# Patient Record
Sex: MALE | Race: WHITE | NOT HISPANIC OR LATINO | Employment: FULL TIME | URBAN - METROPOLITAN AREA
[De-identification: names, ages, dates, MRNs, and addresses within clinical notes are randomized per-mention and may not be internally consistent; named-entity substitution may affect disease eponyms.]

---

## 2017-05-17 ENCOUNTER — HOSPITAL ENCOUNTER (OUTPATIENT)
Dept: GENERAL RADIOLOGY | Facility: CLINIC | Age: 52
Discharge: HOME | End: 2017-05-17
Attending: DERMATOLOGY | Admitting: DERMATOLOGY
Payer: COMMERCIAL

## 2017-05-17 DIAGNOSIS — L40.0 PSORIASIS VULGARIS: ICD-10-CM

## 2017-05-17 PROCEDURE — 71020 XR CHEST 2 VW: CPT

## 2021-12-06 ASSESSMENT — ANXIETY QUESTIONNAIRES
1. FEELING NERVOUS, ANXIOUS, OR ON EDGE: NOT AT ALL
7. FEELING AFRAID AS IF SOMETHING AWFUL MIGHT HAPPEN: NOT AT ALL
GAD7 TOTAL SCORE: 0
5. BEING SO RESTLESS THAT IT IS HARD TO SIT STILL: NOT AT ALL
4. TROUBLE RELAXING: NOT AT ALL
5. BEING SO RESTLESS THAT IT IS HARD TO SIT STILL: NOT AT ALL
2. NOT BEING ABLE TO STOP OR CONTROL WORRYING: NOT AT ALL
8. IF YOU CHECKED OFF ANY PROBLEMS, HOW DIFFICULT HAVE THESE MADE IT FOR YOU TO DO YOUR WORK, TAKE CARE OF THINGS AT HOME, OR GET ALONG WITH OTHER PEOPLE?: NOT DIFFICULT AT ALL
4. TROUBLE RELAXING: NOT AT ALL
7. FEELING AFRAID AS IF SOMETHING AWFUL MIGHT HAPPEN: NOT AT ALL
GAD7 TOTAL SCORE: 0
3. WORRYING TOO MUCH ABOUT DIFFERENT THINGS: NOT AT ALL
6. BECOMING EASILY ANNOYED OR IRRITABLE: NOT AT ALL
6. BECOMING EASILY ANNOYED OR IRRITABLE: NOT AT ALL
1. FEELING NERVOUS, ANXIOUS, OR ON EDGE: NOT AT ALL
3. WORRYING TOO MUCH ABOUT DIFFERENT THINGS: NOT AT ALL
2. NOT BEING ABLE TO STOP OR CONTROL WORRYING: NOT AT ALL
8. IF YOU CHECKED OFF ANY PROBLEMS, HOW DIFFICULT HAVE THESE MADE IT FOR YOU TO DO YOUR WORK, TAKE CARE OF THINGS AT HOME, OR GET ALONG WITH OTHER PEOPLE?: NOT DIFFICULT AT ALL
7. FEELING AFRAID AS IF SOMETHING AWFUL MIGHT HAPPEN: NOT AT ALL
GAD7 TOTAL SCORE: 0
7. FEELING AFRAID AS IF SOMETHING AWFUL MIGHT HAPPEN: NOT AT ALL

## 2021-12-07 ENCOUNTER — VIRTUAL VISIT (OUTPATIENT)
Dept: INTERNAL MEDICINE | Facility: CLINIC | Age: 56
End: 2021-12-07
Payer: COMMERCIAL

## 2021-12-07 DIAGNOSIS — Z22.7 LTBI (LATENT TUBERCULOSIS INFECTION): Primary | ICD-10-CM

## 2021-12-07 DIAGNOSIS — Z00.00 ENCOUNTER FOR PREVENTATIVE ADULT HEALTH CARE EXAMINATION: Primary | ICD-10-CM

## 2021-12-07 PROCEDURE — 99207 PR NO CHARGE LOS: CPT

## 2021-12-07 RX ORDER — USTEKINUMAB 90 MG/ML
90 INJECTION, SOLUTION SUBCUTANEOUS
COMMUNITY
Start: 2020-03-09

## 2021-12-07 RX ORDER — ATORVASTATIN CALCIUM 20 MG/1
20 TABLET, FILM COATED ORAL
COMMUNITY
Start: 2020-10-28 | End: 2022-06-21

## 2021-12-07 ASSESSMENT — ANXIETY QUESTIONNAIRES
GAD7 TOTAL SCORE: 0
GAD7 TOTAL SCORE: 0

## 2021-12-07 NOTE — PROGRESS NOTES
Health Maintenance:  Do you have a PCP? No  When was your last visit with your PCP?   When was your last eye exam? 1 year  Have you ever had a colonoscopy? Yes   If yes, when? 2020  Have you ever had any polyps removed? Yes     As part of your visit we will set up a DEXA scan which will measure your body composition. We have a few questions that need to be answered before we can schedule this scan:   What is your approximate weight? 210   Have you ever had a DEXA scan within the past 2 years? No   Will you have any other imaging studies with contrast (x-ray, CT scan) within 7  days of this appointment? No   Have you had any spine or hip surgery? No   Do you take any vitamins that contain calcium or antacids with calcium? No    If yes, stop taking 24 hours prior to visit.     Goals for the Visit:  1. Thorough Comprehensive Preventive Exam  2. Need chest xray  3.   Pertinent past Medical/Family and Social HX:   Pertinent sx that desire are addressed with this visit:     Answers for HPI/ROS submitted by the patient on 12/6/2021  NEENA 7 TOTAL SCORE: 0  General Symptoms: No  Skin Symptoms: No  HENT Symptoms: No  EYE SYMPTOMS: No  HEART SYMPTOMS: No  LUNG SYMPTOMS: No  INTESTINAL SYMPTOMS: No  URINARY SYMPTOMS: No  REPRODUCTIVE SYMPTOMS: No  SKELETAL SYMPTOMS: No  BLOOD SYMPTOMS: No  NERVOUS SYSTEM SYMPTOMS: No  MENTAL HEALTH SYMPTOMS: No        Instructions prior to appointment:   1. Fast beginning at 10 pm for lab appointment  2. If your preventive care assessment package includes a Fitness Assessment, please bring athletic shoes. Complementary [employer] fitness attire is provided and yours to keep.  3. If eye exam, eyes may be dilated, it will last 4-6 hours, may want to bring sunglasses.   4. May bring laptop or other work materials for use during downtime.   5. You will receive an email about 3 days prior to your visit with a final itinerary, menu selections for the complementary breakfast and lunch  and instructions for the visit.     Complimentary  Parking provided. Drop off car in front of [facility], take the patient elevators to the [facility]. When you enter in the lobby, identify yourself as an [facility] [atient and you will be escorted up to the clinic.   If questions arise prior to your appointment please contact the clinic at [phone].

## 2021-12-13 ENCOUNTER — APPOINTMENT (OUTPATIENT)
Dept: INTERNAL MEDICINE | Facility: CLINIC | Age: 56
End: 2021-12-13
Payer: COMMERCIAL

## 2021-12-13 ENCOUNTER — OFFICE VISIT (OUTPATIENT)
Dept: INTERNAL MEDICINE | Facility: CLINIC | Age: 56
End: 2021-12-13
Payer: COMMERCIAL

## 2021-12-13 ENCOUNTER — ANCILLARY PROCEDURE (OUTPATIENT)
Dept: GENERAL RADIOLOGY | Facility: CLINIC | Age: 56
End: 2021-12-13
Attending: INTERNAL MEDICINE
Payer: COMMERCIAL

## 2021-12-13 ENCOUNTER — OFFICE VISIT (OUTPATIENT)
Dept: AUDIOLOGY | Facility: CLINIC | Age: 56
End: 2021-12-13
Payer: COMMERCIAL

## 2021-12-13 ENCOUNTER — OFFICE VISIT (OUTPATIENT)
Dept: GASTROENTEROLOGY | Facility: CLINIC | Age: 56
End: 2021-12-13
Payer: COMMERCIAL

## 2021-12-13 ENCOUNTER — ANCILLARY PROCEDURE (OUTPATIENT)
Dept: BONE DENSITY | Facility: CLINIC | Age: 56
End: 2021-12-13
Payer: COMMERCIAL

## 2021-12-13 ENCOUNTER — OFFICE VISIT (OUTPATIENT)
Dept: DERMATOLOGY | Facility: CLINIC | Age: 56
End: 2021-12-13
Payer: COMMERCIAL

## 2021-12-13 VITALS
HEART RATE: 56 BPM | HEIGHT: 74 IN | RESPIRATION RATE: 16 BRPM | OXYGEN SATURATION: 99 % | BODY MASS INDEX: 28.11 KG/M2 | TEMPERATURE: 98.3 F | WEIGHT: 219 LBS

## 2021-12-13 DIAGNOSIS — R03.0 ELEVATED BLOOD PRESSURE READING WITHOUT DIAGNOSIS OF HYPERTENSION: ICD-10-CM

## 2021-12-13 DIAGNOSIS — D18.01 CHERRY ANGIOMA: ICD-10-CM

## 2021-12-13 DIAGNOSIS — G47.30 SLEEP APNEA, UNSPECIFIED TYPE: ICD-10-CM

## 2021-12-13 DIAGNOSIS — Z00.00 ENCOUNTER FOR PREVENTATIVE ADULT HEALTH CARE EXAMINATION: ICD-10-CM

## 2021-12-13 DIAGNOSIS — L82.1 SEBORRHEIC KERATOSIS: Primary | ICD-10-CM

## 2021-12-13 DIAGNOSIS — I77.810 ASCENDING AORTA DILATATION (H): ICD-10-CM

## 2021-12-13 DIAGNOSIS — Z12.83 SKIN CANCER SCREENING: ICD-10-CM

## 2021-12-13 DIAGNOSIS — H90.3 SENSORINEURAL HEARING LOSS, BILATERAL: Primary | ICD-10-CM

## 2021-12-13 DIAGNOSIS — E78.5 DYSLIPIDEMIA: ICD-10-CM

## 2021-12-13 DIAGNOSIS — Z00.00 ENCOUNTER FOR PREVENTATIVE ADULT HEALTH CARE EXAMINATION: Primary | ICD-10-CM

## 2021-12-13 DIAGNOSIS — Z22.7 LTBI (LATENT TUBERCULOSIS INFECTION): ICD-10-CM

## 2021-12-13 DIAGNOSIS — E66.3 OVERWEIGHT: ICD-10-CM

## 2021-12-13 DIAGNOSIS — Z29.9 PREVENTIVE MEASURE: ICD-10-CM

## 2021-12-13 DIAGNOSIS — L81.4 LENTIGINES: ICD-10-CM

## 2021-12-13 DIAGNOSIS — D22.9 MULTIPLE BENIGN NEVI: ICD-10-CM

## 2021-12-13 DIAGNOSIS — L40.9 PSORIASIS: ICD-10-CM

## 2021-12-13 LAB
ALBUMIN UR-MCNC: NEGATIVE MG/DL
ALP SERPL-CCNC: 53 U/L (ref 40–150)
ALT SERPL W P-5'-P-CCNC: 44 U/L (ref 0–70)
APPEARANCE UR: CLEAR
BASOPHILS # BLD AUTO: 0 10E3/UL (ref 0–0.2)
BASOPHILS NFR BLD AUTO: 1 %
BILIRUB UR QL STRIP: NEGATIVE
CHOLEST SERPL-MCNC: 174 MG/DL
COLOR UR AUTO: YELLOW
CREAT SERPL-MCNC: 0.97 MG/DL (ref 0.66–1.25)
DEPRECATED CALCIDIOL+CALCIFEROL SERPL-MC: 24 UG/L (ref 20–75)
EOSINOPHIL # BLD AUTO: 0.2 10E3/UL (ref 0–0.7)
EOSINOPHIL NFR BLD AUTO: 3 %
ERYTHROCYTE [DISTWIDTH] IN BLOOD BY AUTOMATED COUNT: 12.5 % (ref 10–15)
FASTING STATUS PATIENT QL REPORTED: ABNORMAL
FASTING STATUS PATIENT QL REPORTED: NORMAL
GFR SERPL CREATININE-BSD FRML MDRD: 87 ML/MIN/1.73M2
GLUCOSE BLD-MCNC: 99 MG/DL (ref 70–99)
GLUCOSE UR STRIP-MCNC: NEGATIVE MG/DL
HCT VFR BLD AUTO: 47.4 % (ref 40–53)
HDLC SERPL-MCNC: 47 MG/DL
HGB BLD-MCNC: 16.8 G/DL (ref 13.3–17.7)
HGB UR QL STRIP: NEGATIVE
HIV 1+2 AB+HIV1 P24 AG SERPL QL IA: NONREACTIVE
HOLD SPECIMEN: NORMAL
HOLD SPECIMEN: NORMAL
IMM GRANULOCYTES # BLD: 0 10E3/UL
IMM GRANULOCYTES NFR BLD: 0 %
KETONES UR STRIP-MCNC: NEGATIVE MG/DL
LDLC SERPL CALC-MCNC: 107 MG/DL
LEUKOCYTE ESTERASE UR QL STRIP: NEGATIVE
LYMPHOCYTES # BLD AUTO: 1.8 10E3/UL (ref 0.8–5.3)
LYMPHOCYTES NFR BLD AUTO: 35 %
MCH RBC QN AUTO: 33 PG (ref 26.5–33)
MCHC RBC AUTO-ENTMCNC: 35.4 G/DL (ref 31.5–36.5)
MCV RBC AUTO: 93 FL (ref 78–100)
MONOCYTES # BLD AUTO: 0.5 10E3/UL (ref 0–1.3)
MONOCYTES NFR BLD AUTO: 10 %
MUCOUS THREADS #/AREA URNS LPF: PRESENT /LPF
NEUTROPHILS # BLD AUTO: 2.7 10E3/UL (ref 1.6–8.3)
NEUTROPHILS NFR BLD AUTO: 51 %
NITRATE UR QL: NEGATIVE
NONHDLC SERPL-MCNC: 127 MG/DL
NRBC # BLD AUTO: 0 10E3/UL
NRBC BLD AUTO-RTO: 0 /100
PH UR STRIP: 5 [PH] (ref 5–7)
PLATELET # BLD AUTO: 166 10E3/UL (ref 150–450)
PSA SERPL-MCNC: 0.43 UG/L (ref 0–4)
RADIOLOGIST FLAGS: NORMAL
RBC # BLD AUTO: 5.09 10E6/UL (ref 4.4–5.9)
RBC URINE: 1 /HPF
SP GR UR STRIP: 1.02 (ref 1–1.03)
TRIGL SERPL-MCNC: 100 MG/DL
TSH SERPL DL<=0.005 MIU/L-ACNC: 1.26 MU/L (ref 0.4–4)
UROBILINOGEN UR STRIP-MCNC: NORMAL MG/DL
WBC # BLD AUTO: 5.3 10E3/UL (ref 4–11)
WBC URINE: <1 /HPF

## 2021-12-13 PROCEDURE — 92557 COMPREHENSIVE HEARING TEST: CPT | Performed by: AUDIOLOGIST

## 2021-12-13 PROCEDURE — 99207 PR NO CHARGE LOS: CPT

## 2021-12-13 PROCEDURE — 77080 DXA BONE DENSITY AXIAL: CPT | Performed by: INTERNAL MEDICINE

## 2021-12-13 PROCEDURE — 80061 LIPID PANEL: CPT | Performed by: PATHOLOGY

## 2021-12-13 PROCEDURE — 99386 PREV VISIT NEW AGE 40-64: CPT | Mod: 25 | Performed by: INTERNAL MEDICINE

## 2021-12-13 PROCEDURE — 84460 ALANINE AMINO (ALT) (SGPT): CPT | Performed by: PATHOLOGY

## 2021-12-13 PROCEDURE — 99000 SPECIMEN HANDLING OFFICE-LAB: CPT | Performed by: PATHOLOGY

## 2021-12-13 PROCEDURE — 82947 ASSAY GLUCOSE BLOOD QUANT: CPT | Performed by: PATHOLOGY

## 2021-12-13 PROCEDURE — 94375 RESPIRATORY FLOW VOLUME LOOP: CPT | Performed by: INTERNAL MEDICINE

## 2021-12-13 PROCEDURE — 90715 TDAP VACCINE 7 YRS/> IM: CPT | Performed by: INTERNAL MEDICINE

## 2021-12-13 PROCEDURE — 87389 HIV-1 AG W/HIV-1&-2 AB AG IA: CPT | Mod: 90 | Performed by: PATHOLOGY

## 2021-12-13 PROCEDURE — 84443 ASSAY THYROID STIM HORMONE: CPT | Performed by: PATHOLOGY

## 2021-12-13 PROCEDURE — 92567 TYMPANOMETRY: CPT | Performed by: AUDIOLOGIST

## 2021-12-13 PROCEDURE — 85025 COMPLETE CBC W/AUTO DIFF WBC: CPT | Performed by: PATHOLOGY

## 2021-12-13 PROCEDURE — 93000 ELECTROCARDIOGRAM COMPLETE: CPT | Performed by: INTERNAL MEDICINE

## 2021-12-13 PROCEDURE — 99203 OFFICE O/P NEW LOW 30 MIN: CPT | Performed by: PHYSICIAN ASSISTANT

## 2021-12-13 PROCEDURE — 82306 VITAMIN D 25 HYDROXY: CPT | Mod: 90 | Performed by: PATHOLOGY

## 2021-12-13 PROCEDURE — 96999 UNLISTED SPEC DERM SVC/PX: CPT | Performed by: INTERNAL MEDICINE

## 2021-12-13 PROCEDURE — 81001 URINALYSIS AUTO W/SCOPE: CPT | Performed by: PATHOLOGY

## 2021-12-13 PROCEDURE — 82565 ASSAY OF CREATININE: CPT | Performed by: PATHOLOGY

## 2021-12-13 PROCEDURE — 84075 ASSAY ALKALINE PHOSPHATASE: CPT | Performed by: PATHOLOGY

## 2021-12-13 PROCEDURE — 71046 X-RAY EXAM CHEST 2 VIEWS: CPT | Mod: GC | Performed by: RADIOLOGY

## 2021-12-13 PROCEDURE — G0103 PSA SCREENING: HCPCS | Performed by: PATHOLOGY

## 2021-12-13 PROCEDURE — 36415 COLL VENOUS BLD VENIPUNCTURE: CPT | Performed by: PATHOLOGY

## 2021-12-13 PROCEDURE — 99207 PR NO BILLABLE SERVICE THIS VISIT: CPT | Performed by: DIETITIAN, REGISTERED

## 2021-12-13 RX ORDER — PREDNISONE 20 MG/1
20 TABLET ORAL 2 TIMES DAILY
Qty: 3 TABLET | Refills: 0 | Status: SHIPPED | OUTPATIENT
Start: 2021-12-13 | End: 2021-12-13

## 2021-12-13 RX ORDER — DIPHENHYDRAMINE HCL 25 MG
25 TABLET ORAL EVERY 8 HOURS PRN
Qty: 1 TABLET | Refills: 0 | COMMUNITY
Start: 2021-12-13

## 2021-12-13 ASSESSMENT — PAIN SCALES - GENERAL
PAINLEVEL: NO PAIN (0)
PAINLEVEL: NO PAIN (0)

## 2021-12-13 ASSESSMENT — MIFFLIN-ST. JEOR: SCORE: 1893.13

## 2021-12-13 NOTE — NURSING NOTE
Chief Complaint   Patient presents with     Physical     Patient is here for annual physical     Uyen Snowden CMA 7:53 AM on 12/13/2021.

## 2021-12-13 NOTE — LETTER
"December 27, 2021      Neel NAM Julio  [address] [address]              Dear Landen,     It was a pleasure to have met you at your recent visit to the [facility].  I am writing to report the results of your tests, and to review several recommendations.     A complete blood count was normal, including a white blood cell count of 5300, a hemoglobin level of 16.8, and a platelet count of 166,000.  A creatinine level, which measures kidney function, was normal at 0.97 milligrams/deciliter.  Alkaline phosphatase and alanine aminotransferase (ALT), enzymes that measure liver activity, were normal at 53 and 44 units/liter, respectively.     Your total cholesterol level was 174, with a triglyceride level of 100, an HDL or \"good\" cholesterol level of 47, an LDL or \"bad\" cholesterol level of 107, and a cholesterol/HDL ratio of 3.7.  As we discussed at the time of your appointment, guidelines from the American Heart Association and American College of Cardiology estimate your 10-year risk of a vascular event at 5.9% (optimal for your age/gender cohort is 3.9%).  I encourage you to continue a regimen of regular exercise, while maintaining ideal weight and following a modified diet.  Specifically, I encourage you to limit calorie intake, especially sugars, starches, saturated and \"trans-\" fats, while including monounsaturated and omega-3 fats in your diet.  Sources of monounsaturated fat include olive oil, almonds, and avocados; sources of omega-3 fat include oily fish, such as salmon, sardines, light tuna, and trout; other sources include walnuts and flaxseed oil.  You were advised to discuss with your cardiologist whether to consider daily aspirin use, based on your history of a positive coronary calcium scan in the setting of your recent CT suggesting dilation of the sinuses of Valsalva (see below).    A PSA level, which screens for prostate problems, was acceptable " "at 0.43.  A thyroid stimulating hormone (TSH) level, which measures thyroid function, was normal at 1.26.  Your glucose level was normal at 99.  An HIV screen was nonreactive, or negative/normal.  Analysis of your urine was negative, or normal.     An EKG was unremarkable.  A spirometry test, which measures lung function, showed an FEV1 of 4.12, with an FVC of 5.52; these readings were 97% and 100% of predicted values, respectively.     Dual energy x-ray absorptiometry (DEXA) showed normal bone density, with most negative and valid T-score of -0.4 at the level of the left femoral neck.      Body composition analysis showed 24.6% fat (53rd percentile).  Your body mass index was 28.1; generally, readings of 20-25 are considered normal.  In addition to regular exercise and reduced calorie intake, strategies to facilitate weight loss include copious water intake; use of small plates; healthful snacking, such as small quantities of almonds or walnuts consumed mid-morning and mid-afternoon; avoidance of sugars and starches, which can trigger increased insulin release and stimulate appetite; slow, \"mindful\" eating, with pleasing food presentation and focus on non-caloric aspects of the dining experience, including social interactions and appreciation of the aromas, appearance, and flavors of the meal.  Preliminary evidence suggests possible weight and glucose control benefits from intermittent fasting, such as limiting food consumption to an 8-hour window each day, while fasting for the remaining 16 hours of each day.  Because this may lead to loss of muscle mass, it is important to maintain adequate protein intake and engage in strength training should you consider this approach.    At your request, a chest x-ray was arranged.  Your results showed no acute cardiopulmonary processes to suggest mycobacterial disease, such as TB.  A question was raised regarding the ascending aorta, for which chest CT angiogram was " "recommended for further evaluation.    A CT angiogram of the chest with contrast showed no thoracic aortic aneurysm or dissection, with mild splenomegaly and possible mild dilation of the sinuses of Valsalva.  You were advised to arrange hematology consultation to discuss the finding of splenomegaly.  Consultation with a cardiologist was recommended to discuss any need for follow-up of the 41-mm diameter sinuses of Valsalva and to review the risk versus benefit of aspirin treatment in the setting of an elevated 10-year risk of vascular disease (see above) and previously identified coronary artery calcification.    At a previous sleep clinic evaluation, you were advised to arrange a follow-up appointment to monitor your response to use of an oral appliance.  For this reason and because untreated sleep apnea can contribute to elevated blood pressure, I recommend that you schedule sleep clinic consultation at your convenience.    At the time of your appointment, your blood pressure was elevated at 133/85 on average of 3 automated readings.  In addition to arranging sleep clinic follow-up, you were advised to reduce weight, continue a regimen of regular aerobic (\"cardio\") exercise, and avoid substances that can raise blood pressure, including ibuprofen, naproxen, pseudoephedrine, and alcohol.  Please measure and record your home blood pressure readings and discuss your findings with your usual physician if your average blood pressure exceeds 129/84.    With regard to preventive care, I recommend daily use of a vitamin D3 supplement, 100 mcg daily for 2 months, then 50 mcg daily.  You were advised to maintain daily calcium intake of 1200 mg, preferably from dietary sources.  We reviewed the debate regarding the benefit of multivitamin supplements and the importance of selecting a product that does not contain iron should you choose to proceed.  A tetanus (Tdap) vaccination was provided at the time of your appointment, " bringing your immunization status up-to-date.  We agreed that you would schedule your second recombinant zoster (shingles) vaccination through an outside clinic.  You were advised to follow the previously recommended timetable for follow-up colonoscopy.    Bill, I enjoyed visiting with you at your recent appointment and learning about your path to [facility] and the talents of your daughters. Thank you for choosing the [facility] and [facility] for your medical needs.  Best wishes for a productive and healthy year.  Please do not hesitate to contact me with any questions regarding these results or recommendations.           Sincerely,           Neel Sawant M.D.  [facility]   Division of General Internal Medicine  Department of Medicine

## 2021-12-13 NOTE — PROGRESS NOTES
"[facility] Outpatient Medical Nutrition Therapy      Time Spent:  35 minutes  Session Type:  Initial   Referral Source: [employer] Package/Dr. Neel Sawant  Reason for RD Visit:   Nutritional counseling     Nutrition Assessment:  Patient is here for initial annual visit with Registered Dietitian (LORI).  Patient is a 56 y.o. male with history that includes obstructive sleep apnea. He stated that overall he feels he needs a healthy diet.  He tries to eat a good amounts of vegetables along with lean proteins.  He tends to skip breakfast due to not hungry in the morning and tends to like hot breakfast items more than cold so due to not being hungry does not typically eat it unless traveling.  He tends to eat a lunch and dinner meal and sometimes has either pretzels or chips as a snack in the afternoon, estimated a few times per week.  He drinks 3 to 4 cups of black coffee and at least 2-3 of his water bottles per day plus additional water with exercise.  He has 1 alcoholic beverage a night on week nights and on weekends may have 2-3 drinks on those nights each.  He exercises most days of the week and additionally takes daily dog walks for 1-1/2 to 3 miles with his wife.  Overall he is interested in general nutrition information and does not currently have any specific questions or concerns nutrition wise.      There is no problem list on file for this patient.      Estimated body mass index is 28.12 kg/m  as calculated from the following:    Height as of an earlier encounter on 12/13/21: 1.88 m (6' 2\").    Weight as of an earlier encounter on 12/13/21: 99.3 kg (219 lb).    Diet Recall:  (Some usual meals/snacks and beverages))  Meal Food    Breakfast  skips   Lunch  salad with meat or fish or soup with shrimp or scallops or meat with vegetables   Dinner  chicken or salmon with vegetables occasionally some rice or occasional Posta dish with sausage and tomato sauce   Snacks  a few times a week in the " afternoon has pretzels or potato chips    Beverages  at least 32 to 48 ounce water plus and 3 to 4 cups of black coffee   Alcohol Intake  1 drink each night on week nights and 2-3 drinks on weekend nights.  Typically beer or bourbon or wine        Labs: Reviewed  Pertinent Medications/vitamin and mineral supplements:     Current Outpatient Medications   Medication     atorvastatin (LIPITOR) 20 MG tablet     ustekinumab (STELARA) 90 MG/ML     No current facility-administered medications for this visit.       Food Allergies: No known food allergies    Physical Activity: Most days does rowing machine or [employer] bike running or boxing for at least 30 minutes plus takes 1-1/2 to 3 mile dog walks every day    Nutrition Diagnosis:    Food and nutrition related knowledge deficit related to interest in general healthful diet as evidenced by pt report.    Nutrition Prescription: Recommended general healthful diet     Nutrition Intervention:    Nutrition Education/Counseling:  -Provided general healthful diet nutrition education with tips and suggestions.   -Reviewed the Plate method meal plan with food groups and some example foods in groups.   -Discussed general sodium recommendations of getting less than 2300 mg/day. Discussed some low sodium diet tips.   -Reviewed the difference between unsaturated and saturated fats with recommendation to aim for choosing unsaturated fat over saturated fats and discussed examples of both.  -Encouraged patient to eat adequate fruit and vegetable servings per day (at least 5 servings but explained recommendation to aim for 9-11 servings per day).   -Discussed adequate hydration/recommendations and encouraged pt to drink at least 48 oz-64 oz (6-8 cups) per day. Told pt okay add lemon/lime to water or can flavor with cucumber slice or fresh herbs/fruit for example to naturally flavor water.     Answered patient's questions. Patient verbalized understanding of education provided. Provided pt  with RD contact information and list of goals below.    Educational Materials Provided:   Health Daily Nutrition Plate method handout    Goals:    1. Continue eating balanced meals with several servings of vegetables and fruit at each.    --Can use/continue to use the Plate Method meal plan for general guidance on getting balanced meals, such as making 1/2 your plate vegetables and fruit, 1/4 of your plate lean protein sources and the other 1/4 of your plate whole grain starches/starchy vegetables. Additionally include at least 1-2 servings of unsaturated fats at meals (olive oil, avocado, nuts, seeds, nut butters as some examples).    2. Increase water/consistently drink at least 48 oz-64 oz (6-8 cups) of water per day.    Nutrition Monitoring and Evaluation: Will monitor adherence to nutrition recommendations at future RD visits.     Further Medical Nutrition Therapy:  Annual visits  Patient was encouraged to call/contact RD with any further questions.    Mila Blanco, MS, RD, LD

## 2021-12-13 NOTE — LETTER
"    12/13/2021         RE: Neel Kim  [address] [address]        Dear Colleague,    Thank you for referring your patient, Neel Kim, to the [facility] [place]. Please see a copy of my visit note below.    [facility] Outpatient Medical Nutrition Therapy      Time Spent:  35 minutes  Session Type:  Initial   Referral Source: [employer] Package/Dr. Neel Sawant  Reason for RD Visit:   Nutritional counseling     Nutrition Assessment:  Patient is here for initial annual visit with Registered Dietitian (LORI).  Patient is a 56 y.o. male with history that includes obstructive sleep apnea. He stated that overall he feels he needs a healthy diet.  He tries to eat a good amounts of vegetables along with lean proteins.  He tends to skip breakfast due to not hungry in the morning and tends to like hot breakfast items more than cold so due to not being hungry does not typically eat it unless traveling.  He tends to eat a lunch and dinner meal and sometimes has either pretzels or chips as a snack in the afternoon, estimated a few times per week.  He drinks 3 to 4 cups of black coffee and at least 2-3 of his water bottles per day plus additional water with exercise.  He has 1 alcoholic beverage a night on week nights and on weekends may have 2-3 drinks on those nights each.  He exercises most days of the week and additionally takes daily dog walks for 1-1/2 to 3 miles with his wife.  Overall he is interested in general nutrition information and does not currently have any specific questions or concerns nutrition wise.      There is no problem list on file for this patient.      Estimated body mass index is 28.12 kg/m  as calculated from the following:    Height as of an earlier encounter on 12/13/21: 1.88 m (6' 2\").    Weight as of an earlier encounter on 12/13/21: 99.3 kg (219 lb).    Diet Recall:  (Some usual meals/snacks and " beverages))  Meal Food    Breakfast  skips   Lunch  salad with meat or fish or soup with shrimp or scallops or meat with vegetables   Dinner  chicken or salmon with vegetables occasionally some rice or occasional Posta dish with sausage and tomato sauce   Snacks  a few times a week in the afternoon has pretzels or potato chips    Beverages  at least 32 to 48 ounce water plus and 3 to 4 cups of black coffee   Alcohol Intake  1 drink each night on week nights and 2-3 drinks on weekend nights.  Typically beer or bourbon or wine        Labs: Reviewed  Pertinent Medications/vitamin and mineral supplements:     Current Outpatient Medications   Medication     atorvastatin (LIPITOR) 20 MG tablet     ustekinumab (STELARA) 90 MG/ML     No current facility-administered medications for this visit.       Food Allergies: No known food allergies    Physical Activity: Most days does rowing machine or [employer] bike running or boxing for at least 30 minutes plus takes 1-1/2 to 3 mile dog walks every day    Nutrition Diagnosis:    Food and nutrition related knowledge deficit related to interest in general healthful diet as evidenced by pt report.    Nutrition Prescription: Recommended general healthful diet     Nutrition Intervention:    Nutrition Education/Counseling:  -Provided general healthful diet nutrition education with tips and suggestions.   -Reviewed the Plate method meal plan with food groups and some example foods in groups.   -Discussed general sodium recommendations of getting less than 2300 mg/day. Discussed some low sodium diet tips.   -Reviewed the difference between unsaturated and saturated fats with recommendation to aim for choosing unsaturated fat over saturated fats and discussed examples of both.  -Encouraged patient to eat adequate fruit and vegetable servings per day (at least 5 servings but explained recommendation to aim for 9-11 servings per day).   -Discussed adequate hydration/recommendations and  encouraged pt to drink at least 48 oz-64 oz (6-8 cups) per day. Told pt okay add lemon/lime to water or can flavor with cucumber slice or fresh herbs/fruit for example to naturally flavor water.     Answered patient's questions. Patient verbalized understanding of education provided. Provided pt with RD contact information and list of goals below.    Educational Materials Provided:   Health Daily Nutrition Plate method handout    Goals:    1. Continue eating balanced meals with several servings of vegetables and fruit at each.    --Can use/continue to use the Plate Method meal plan for general guidance on getting balanced meals, such as making 1/2 your plate vegetables and fruit, 1/4 of your plate lean protein sources and the other 1/4 of your plate whole grain starches/starchy vegetables. Additionally include at least 1-2 servings of unsaturated fats at meals (olive oil, avocado, nuts, seeds, nut butters as some examples).    2. Increase water/consistently drink at least 48 oz-64 oz (6-8 cups) of water per day.    Nutrition Monitoring and Evaluation: Will monitor adherence to nutrition recommendations at future RD visits.     Further Medical Nutrition Therapy:  Annual visits  Patient was encouraged to call/contact RD with any further questions.    Mila Blanco, MS, RD, LD

## 2021-12-13 NOTE — LETTER
12/13/2021      RE: Neel Kim  [address] [facility] [address]        History and Physical Examination     SUBJECTIVE: Chief concern: preventive health review.     Past Medical History:  1.  History of adenomatous polyp (1.2 cm sessile serrated adenoma, 2020); 3-year follow-up recommended at time of diagnosis.  2.  Obstructive sleep apnea with questionable central component, managed with oral appliance.  3.  Dyslipidemia.  4.  Coronary artery disease by coronary calcium CT, 10/2018 (76th percentile).  5.  Psoriasis.  6.  History of latent tuberculosis, status post treatment    Adverse Drug Reactions: None.     Current Medications:  Atorvastatin, 20 mg daily   Ustekinumab, 90 mg/millimeter, 90 mg subcutaneously every 12 weeks.  Tar shampoo, as needed every 2 weeks.  Calcipotriene 0.005% ointment b.i.d. as needed for breakthrough rash.      Habits:  Tobacco: None since 2001; history of 24 pack-years of previous use.  Alcohol: 0-2 servings per weekday and 2-4 servings per weekend day  Caffeine: 3-4 servings of coffee per day  Street drugs: None     Social History:  to scotty Amor R.N. and and father of 2 daughters: Shannon, age 19, who enjoys singing, currently living at home while addressing mental health challenges that were exacerbated during her freshman year at [employer]; and Alla, age 16, a National Merit Scholar mayito at [employer] who enjoys guitar and singing.  Landen was born in [place], [place] north of [place], and came to [place] at age 1, later moving with his family to [place].  He graduated from [employer], where he met his wife.  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    Away from work, he enjoys family activities, exercise, and traveling to a home in  "[place].  He exercises or 30-90 minutes, 6 days per week, typically running, cycling, walking, boxing, or working out on a ski ergometer.    Family History:  Father  at age 83, with history of type 2 diabetes mellitus, osteoporosis, peripheral arterial disease, and diverticulitis.  Mother is 78, with history of breast cancer, treated in her 30s.   A brother 15 years his senior is in good health.  A sister 2 years his senior has a history of thyroid (Graves') disease.  A sister 2 years his mayito has endometriosis.  A brother 3 years his mayito has a history of diverticulitis.  A sister 8 years his mayito has a history of ovarian cancer, for which she was treated at age 15; her twin brother is in good health.  Older daughter has a history of strabismus and anxiety; younger daughter is in good health.    Review of Systems: Colonoscopy was completed in ; 3-year follow-up was recommended at that time.  Hepatitis C screen was negative in .  Most recent tetanus (unclear whether Td ord Tdap) was administered in 2011(or 2012; documentation unclear).  MMR administered in 2012.  First recombinant zoster vaccination administered in 10/5/2021.  Influenza vaccination administered in 10/2021.  Covid 19 vaccination ([employer]) administered on 2021; mode during the Covid booster administered on 10/25/2021.  Remainder of complete review of systems was negative.    OBJECTIVE:     Vital signs: Height 74 inches.  Weight 219 pounds.  Blood pressure 133/85 on average of 3 automated readings.  Heart rate 56.  Respiratory rate 16.  Temperature 98.3 degrees.  O2 saturation 99% on room air.  General: Alert, neatly dressed and groomed, in no acute distress.  HEENT: Atraumatic and normocephalic. Eyelids, pupils, and conjunctivae appeared normal. Lips, teeth and gums appear normal.  Oropharynx showed moist mucous membranes, without exudate or erythema.  Somewhat \"crowded\" soft palate.  Neck: Supple, without thyromegaly, " "mass, or bruit. No cervical or supraclavicular lymphadenopathy.  Back: No spinal or costovertebral angle tenderness.  Chest: Clear to auscultation and percussion. Normal respiratory effort.  Cardiovascular: No jugular venous distention. Regular rate and rhythm, normal S1, S2 without murmur.  Abdomen: Bowel sounds positive; soft, nontender, without rebound, guarding, hepatosplenomegaly or mass.  Extremities: No cyanosis or edema.  Genitalia: Normal male genitalia, without scrotal mass or hernia. No inguinal lymphadenopathy.  Rectal: Normal tone, with smooth, nontender, mildly enlarged prostate. No rectal mass.  Skin: Examination was deferred; full evaluation was completed earlier in day through dermatology clinic  Neurologic: Cranial nerves II-XII were grossly intact. Sensory and motor examinations were normal. Normal gait.  Mini-cog score was 5/5.  Psychiatric: Alert and oriented ×3. Normal affect. Judgment and insight intact.  PHQ-2 score was 0.  NEENA-7 score was 0.    Creatinine 0.97, alkaline phosphatase 53, ALT 44, cholesterol 174, HDL 47, , triglycerides 100, cholesterol/HDL 3.7, PSA 0.43, TSH 1.26, 25-hydroxy vitamin D 24, glucose 99, white blood cell count 5300, hemoglobin 16.8, platelets 166,000, HIV nonreactive, urinalysis unremarkable.    EKG was notable only for sinus bradycardia.  Spirometry showed an FEV1 of 4.12, with an FVC of 5.52; readings were 97% and 100% of predicted values, respectively.    Preliminary DEXA results showed normal bone density.  Body composition analysis showed 24.6% fat (53rd percentile).  BMI was 28.1.    Chest x-ray (arranged at patient request based on previous recommendation and history of treated latent tuberculosis): \"1.  No acute cardiopulmonary processes.  No radiographic evidence of mycobacterial disease.  2.  Question ascending thoracic aortic aneurysm measuring at least 5.2 cm.  Recommend gated chest CT angiogram of the aorta for further " "evaluation.\"    ASSESSMENT:    1.  Abnormal chest x-ray.  Findings suspicious for ascending aortic aneurysm.  CT angiogram of aorta will be arranged, as recommended.  Further guidance will be based on results of this study.  Currently he is asymptomatic; we discussed the importance of immediate in the event of chest discomfort, lightheadedness, profound weakness, etc.    2.  Dyslipidemia.  AHA/ACC guidelines suggest a 10-year vascular disease risk of 5.9%; optimal for his cohort is 3.9%.  Initially, we discussed the possibility of adding aspirin based on his known coronary artery disease by CT coronary calcium assessment in 2018.  In the setting of possible ascending aortic aneurysm, he was advised to forego aspirin treatment at this time.  Further guidance will be based on CT results.  We discussed the importance of weight loss, continued regular exercise, and modification of diet, the elements of which were discussed in detail.    3.  Overweight.  We reviewed strategies surrounding modification of diet, along with the importance of continued strength training and the addition of subthreshold exercise.  We discussed the risks associated with overweight with respect to vascular disease and diabetes mellitus.    4.  Sleep apnea.  He had been advised at a previous sleep clinic evaluation to arrange follow-up to monitor response to oral appliance treatment.  I recommended sleep clinic follow-up to discuss management.    5.  Elevated blood pressure.  We reviewed usual goals and lifestyle measures that can help reduce blood pressure, including weight loss; aerobic exercise; avoidance of ibuprofen, naproxen, pseudoephedrine, etc.; reduced consumption of alcohol; and adequate treatment of sleep apnea.  He was advised to monitor and record home blood pressure readings with plan to review with M.D. if average exceeds 130/85.    6.  Preventive care.  I recommended vitamin D3, 50  g, 2 capsules daily for 2 months, then 1 " capsule daily.  He was advised to maintaining daily calcium intake of 1200 mg, preferably from dietary sources.  We reviewed the debate regarding the benefit of multivitamin supplements and the importance of selecting a product that does not contain iron should he choose to proceed.  He was congratulated for his regimen of regular exercise and encouraged to modify his diet and reduce weight.  Tetanus (Tdap) vaccination was provided at the time of his appointment.  He will arrange his second recombinant zoster vaccination through an outside clinic.  We discussed the importance of closely adhering to the recommended timetable for follow-up colonoscopy.    PLAN: See above.     ~SRT    Answers for HPI/ROS submitted by the patient on 12/6/2021  NEENA 7 TOTAL SCORE: 0  General Symptoms: No  Skin Symptoms: No  HENT Symptoms: No  EYE SYMPTOMS: No  HEART SYMPTOMS: No  LUNG SYMPTOMS: No  INTESTINAL SYMPTOMS: No  URINARY SYMPTOMS: No  REPRODUCTIVE SYMPTOMS: No  SKELETAL SYMPTOMS: No  BLOOD SYMPTOMS: No  NERVOUS SYSTEM SYMPTOMS: No  MENTAL HEALTH SYMPTOMS: No          Neel Sawant MD

## 2021-12-13 NOTE — LETTER
12/13/2021       RE: Neel Kim  [address] [facility] [address]     Dear Colleague,    Thank you for referring your patient, Neel Kim, to the [facility] [place] [facility]. Please see a copy of my visit note below.    [facility] Dermatology Note  Encounter Date: Dec 13, 2021  Office Visit     Dermatology Problem List:  1. Psoriasis  - Previous rx: UVB phototherapy, methotrexate, acitretin, and efalizumab  - Current rx: ustekinumab (started 2009)  - History of latent TB, s/p 9 months of treatment in 2016, receives chest XRs annually by PCP    ____________________________________________    Assessment & Plan:    # Psoriasis  Well controlled on Stelara injections every 3 months (prescribed be outside dermatologist). Continue with current treatment plan.     # Multiple benign nevi. Solar lentigines.   - No concerning lesions today  - Counseled on ABCDEs of melanoma and sun protection - recommend SPF 30 or higher with frequent application   - Return sooner if noticing changing or symptomatic lesions    # Cherry angioma. SK.   Discussed the natural history and benign nature of this lesion. Reassurance provided that no additional treatment is necessary.     Procedures Performed:   None    Follow-up: 1 year(s) in-person, or earlier for new or changing lesions    Staff and Scribe:     Scribe Disclosure:  I, Cherie Tierney, am serving as a scribe to document services personally performed by Stacia Lennon PA-C based on data collection and the provider's statements to me.     Provider Disclosure:   The documentation recorded by the scribe accurately reflects the services I personally performed and the decisions made by me.    All risks, benefits and alternatives were discussed with patient.  Patient is in agreement and understands the assessment and plan.  All questions were answered.  Sun  Screen Education was given.   Return to Clinic annually or sooner as needed.   Stacia Lennon PA-C   [facility]   ____________________________________________    CC: Skin Check (full body skin check)    HPI:  Mr. Neel Kim is a(n) 56 year old male who presents today as a new patient for FBSE.     Today, the patient reports that he has been doing Stelara injections every 3 months for the past 12 years for treatment of his psoriasis. He has small patches on his elbows and scalp, but otherwise reports good control of his psoriasis. He also uses T-gel shampoo once every few months. No family history of skin cancer. He enjoys golfing outdoors and travels to his home in [place] whenever possible. He wears an SPF 30 sunscreen when outdoors.     Patient is otherwise feeling well, without additional skin concerns.     Labs Reviewed:  N/A    Physical Exam:  Vitals: There were no vitals taken for this visit.  SKIN: Full skin, which includes the head/face, both arms, chest, back, abdomen,both legs, genitalia and/or groin buttocks, digits and/or nails, was examined.  - Very faint pink scaly plaques on the bilateral elbows and right knee.   - There are dome shaped bright red papules on the trunk and extremities.   - Multiple regular brown pigmented macules and papules are identified on the trunk and extremities.   - Scattered brown macules on sun exposed areas..   - There are waxy stuck on tan to brown papules on the trunk and extremities.   - No other lesions of concern on areas examined.     Medications:  Current Outpatient Medications   Medication     atorvastatin (LIPITOR) 20 MG tablet     ustekinumab (STELARA) 90 MG/ML     No current facility-administered medications for this visit.      Past Medical History:   There is no problem list on file for this patient.    Past Medical History:   Diagnosis Date     Autoimmune disease (H) 1982    Psoriasis Treated w Stelara     Hyperlipidemia  2011    Atorvastatin 20        CC Referred Self, MD  No address on file on close of this encounter.

## 2021-12-13 NOTE — LETTER
12/13/2021     RE: Neel Kim  [address] [facility] [address]     Dear Colleague,    Thank you for referring your patient, Neel Kim, to the [facility] [place] at [facility]. Please see a copy of my visit note below.     History and Physical Examination     SUBJECTIVE: Chief concern: preventive health review.     Past Medical History:  1.  History of adenomatous polyp (1.2 cm sessile serrated adenoma, 2020); 3-year follow-up recommended at time of diagnosis.  2.  Obstructive sleep apnea with questionable central component, managed with oral appliance.  3.  Dyslipidemia.  4.  Coronary artery disease by coronary calcium CT, 10/2018 (76th percentile).  5.  Psoriasis.  6.  History of latent tuberculosis, status post treatment    Adverse Drug Reactions: None.     Current Medications:  Atorvastatin, 20 mg daily   Ustekinumab, 90 mg/millimeter, 90 mg subcutaneously every 12 weeks.  Tar shampoo, as needed every 2 weeks.  Calcipotriene 0.005% ointment b.i.d. as needed for breakthrough rash.      Habits:  Tobacco: None since 2001; history of 24 pack-years of previous use.  Alcohol: 0-2 servings per weekday and 2-4 servings per weekend day  Caffeine: 3-4 servings of coffee per day  Street drugs: None     Social History:  to scotty Amor R.N. and and father of 2 daughters: Shannon, age 19, who enjoys singing, currently living at home while addressing mental health challenges that were exacerbated during her freshman year at [employer]; and Alla, age 16, a National Merit Scholar mayito at [employer] who enjoys guitar and singing.  Landen was born in [place], [place] north of [place], and came to [place] at age 1, later moving with his family to [place].  He graduated from [employer], where he met his wife.  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    Away from work, he enjoys family activities, exercise, and traveling to a home in [place].  He exercises or 30-90 minutes, 6 days per week, typically running, cycling, walking, boxing, or working out on a ski ergometer.    Family History:  Father  at age 83, with history of type 2 diabetes mellitus, osteoporosis, peripheral arterial disease, and diverticulitis.  Mother is 78, with history of breast cancer, treated in her 30s.   A brother 15 years his senior is in good health.  A sister 2 years his senior has a history of thyroid (Graves') disease.  A sister 2 years his mayito has endometriosis.  A brother 3 years his mayito has a history of diverticulitis.  A sister 8 years his mayito has a history of ovarian cancer, for which she was treated at age 15; her twin brother is in good health.  Older daughter has a history of strabismus and anxiety; younger daughter is in good health.    Review of Systems: Colonoscopy was completed in ; 3-year follow-up was recommended at that time.  Hepatitis C screen was negative in .  Most recent tetanus (unclear whether Td ord Tdap) was administered in 2011(or 2012; documentation unclear).  MMR administered in 2012.  First recombinant zoster vaccination administered in 10/5/2021.  Influenza vaccination administered in 10/2021.  Covid 19 vaccination ([employer]) administered on 2021; mode during the Covid booster administered on 10/25/2021.  Remainder of complete review of systems was negative.    OBJECTIVE:     Vital signs: Height 74 inches.  Weight 219 pounds.  Blood pressure 133/85 on average of 3 automated readings.  Heart rate 56.  Respiratory rate 16.  Temperature 98.3 degrees.  O2 saturation 99% on room air.  General: Alert, neatly dressed and groomed, in no acute distress.  HEENT: Atraumatic and  "normocephalic. Eyelids, pupils, and conjunctivae appeared normal. Lips, teeth and gums appear normal.  Oropharynx showed moist mucous membranes, without exudate or erythema.  Somewhat \"crowded\" soft palate.  Neck: Supple, without thyromegaly, mass, or bruit. No cervical or supraclavicular lymphadenopathy.  Back: No spinal or costovertebral angle tenderness.  Chest: Clear to auscultation and percussion. Normal respiratory effort.  Cardiovascular: No jugular venous distention. Regular rate and rhythm, normal S1, S2 without murmur.  Abdomen: Bowel sounds positive; soft, nontender, without rebound, guarding, hepatosplenomegaly or mass.  Extremities: No cyanosis or edema.  Genitalia: Normal male genitalia, without scrotal mass or hernia. No inguinal lymphadenopathy.  Rectal: Normal tone, with smooth, nontender, mildly enlarged prostate. No rectal mass.  Skin: Examination was deferred; full evaluation was completed earlier in day through dermatology clinic  Neurologic: Cranial nerves II-XII were grossly intact. Sensory and motor examinations were normal. Normal gait.  Mini-cog score was 5/5.  Psychiatric: Alert and oriented ×3. Normal affect. Judgment and insight intact.  PHQ-2 score was 0.  NEENA-7 score was 0.    Creatinine 0.97, alkaline phosphatase 53, ALT 44, cholesterol 174, HDL 47, , triglycerides 100, cholesterol/HDL 3.7, PSA 0.43, TSH 1.26, 25-hydroxy vitamin D 24, glucose 99, white blood cell count 5300, hemoglobin 16.8, platelets 166,000, HIV nonreactive, urinalysis unremarkable.    EKG was notable only for sinus bradycardia.  Spirometry showed an FEV1 of 4.12, with an FVC of 5.52; readings were 97% and 100% of predicted values, respectively.    Preliminary DEXA results showed normal bone density.  Body composition analysis showed 24.6% fat (53rd percentile).  BMI was 28.1.    Chest x-ray (arranged at patient request based on previous recommendation and history of treated latent tuberculosis): \"1.  No " "acute cardiopulmonary processes.  No radiographic evidence of mycobacterial disease.  2.  Question ascending thoracic aortic aneurysm measuring at least 5.2 cm.  Recommend gated chest CT angiogram of the aorta for further evaluation.\"    ASSESSMENT:    1.  Abnormal chest x-ray.  Findings suspicious for ascending aortic aneurysm.  CT angiogram of aorta will be arranged, as recommended.  Further guidance will be based on results of this study.  Currently he is asymptomatic; we discussed the importance of immediate in the event of chest discomfort, lightheadedness, profound weakness, etc.    2.  Dyslipidemia.  AHA/ACC guidelines suggest a 10-year vascular disease risk of 5.9%; optimal for his cohort is 3.9%.  Initially, we discussed the possibility of adding aspirin based on his known coronary artery disease by CT coronary calcium assessment in 2018.  In the setting of possible ascending aortic aneurysm, he was advised to forego aspirin treatment at this time.  Further guidance will be based on CT results.  We discussed the importance of weight loss, continued regular exercise, and modification of diet, the elements of which were discussed in detail.    3.  Overweight.  We reviewed strategies surrounding modification of diet, along with the importance of continued strength training and the addition of subthreshold exercise.  We discussed the risks associated with overweight with respect to vascular disease and diabetes mellitus.    4.  Sleep apnea.  He had been advised at a previous sleep clinic evaluation to arrange follow-up to monitor response to oral appliance treatment.  I recommended sleep clinic follow-up to discuss management.    5.  Elevated blood pressure.  We reviewed usual goals and lifestyle measures that can help reduce blood pressure, including weight loss; aerobic exercise; avoidance of ibuprofen, naproxen, pseudoephedrine, etc.; reduced consumption of alcohol; and adequate treatment of sleep apnea.  He " was advised to monitor and record home blood pressure readings with plan to review with M.D. if average exceeds 130/85.    6.  Preventive care.  I recommended vitamin D3, 50  g, 2 capsules daily for 2 months, then 1 capsule daily.  He was advised to maintaining daily calcium intake of 1200 mg, preferably from dietary sources.  We reviewed the debate regarding the benefit of multivitamin supplements and the importance of selecting a product that does not contain iron should he choose to proceed.  He was congratulated for his regimen of regular exercise and encouraged to modify his diet and reduce weight.  Tetanus (Tdap) vaccination was provided at the time of his appointment.  He will arrange his second recombinant zoster vaccination through an outside clinic.  We discussed the importance of closely adhering to the recommended timetable for follow-up colonoscopy.    PLAN: See above.     ~SRT    Answers for HPI/ROS submitted by the patient on 12/6/2021  NEENA 7 TOTAL SCORE: 0  General Symptoms: No  Skin Symptoms: No  HENT Symptoms: No  EYE SYMPTOMS: No  HEART SYMPTOMS: No  LUNG SYMPTOMS: No  INTESTINAL SYMPTOMS: No  URINARY SYMPTOMS: No  REPRODUCTIVE SYMPTOMS: No  SKELETAL SYMPTOMS: No  BLOOD SYMPTOMS: No  NERVOUS SYSTEM SYMPTOMS: No  MENTAL HEALTH SYMPTOMS: No    Again, thank you for allowing me to participate in the care of your patient.      Sincerely,    Neel Sawant MD

## 2021-12-13 NOTE — PROGRESS NOTES
Neel Kim comes into clinic today at the request of Dr. LUCY Sawant Ordering Provider for EKG.    This service provided today was under the supervising provider of the day Dr. LUCY Sawant, who was available if needed.    Uyen Snowden, [facility]

## 2021-12-13 NOTE — PATIENT INSTRUCTIONS
It was nice meeting you today. Below are the nutrition recommendations we discussed at your visit.    Please let me know if you have any additional questions.    Nutrition Recommendations    1. Continue eating balanced meals with several servings of vegetables and fruit at each.    --Can use/continue to use the Plate Method meal plan for general guidance on getting balanced meals, such as making 1/2 your plate vegetables and fruit, 1/4 of your plate lean protein sources and the other 1/4 of your plate whole grain starches/starchy vegetables. Additionally include at least 1-2 servings of unsaturated fats at meals (olive oil, avocado, nuts, seeds, nut butters as some examples).    2. Increase water/consistently drink at least 48 oz-64 oz (6-8 cups) of water per day.    Thank you,    Mila Blanco, MS, RD, LD

## 2021-12-13 NOTE — PROGRESS NOTES
[facility] Dermatology Note  Encounter Date: Dec 13, 2021  Office Visit     Dermatology Problem List:  1. Psoriasis  - Previous rx: UVB phototherapy, methotrexate, acitretin, and efalizumab  - Current rx: ustekinumab (started 2009)  - History of latent TB, s/p 9 months of treatment in 2016, receives chest XRs annually by PCP    ____________________________________________    Assessment & Plan:    # Psoriasis  Well controlled on Stelara injections every 3 months (prescribed be outside dermatologist). Continue with current treatment plan.     # Multiple benign nevi. Solar lentigines.   - No concerning lesions today  - Counseled on ABCDEs of melanoma and sun protection - recommend SPF 30 or higher with frequent application   - Return sooner if noticing changing or symptomatic lesions    # Cherry angioma. SK.   Discussed the natural history and benign nature of this lesion. Reassurance provided that no additional treatment is necessary.     Procedures Performed:   None    Follow-up: 1 year(s) in-person, or earlier for new or changing lesions    Staff and Scribe:     Scribe Disclosure:  I, Cherie Tierney, am serving as a scribe to document services personally performed by Stacia Lennon PA-C based on data collection and the provider's statements to me.     Provider Disclosure:   The documentation recorded by the scribe accurately reflects the services I personally performed and the decisions made by me.    All risks, benefits and alternatives were discussed with patient.  Patient is in agreement and understands the assessment and plan.  All questions were answered.  Sun Screen Education was given.   Return to Clinic annually or sooner as needed.   Stacia Lennon PA-C   [facility]   ____________________________________________    CC: Skin Check (full body skin check)    HPI:  Mr. Neel Kim is a(n) 56 year old male who presents today as a new patient for  FBSE.     Today, the patient reports that he has been doing Stelara injections every 3 months for the past 12 years for treatment of his psoriasis. He has small patches on his elbows and scalp, but otherwise reports good control of his psoriasis. He also uses T-gel shampoo once every few months. No family history of skin cancer. He enjoys golfing outdoors and travels to his home in [place] whenever possible. He wears an SPF 30 sunscreen when outdoors.     Patient is otherwise feeling well, without additional skin concerns.     Labs Reviewed:  N/A    Physical Exam:  Vitals: There were no vitals taken for this visit.  SKIN: Full skin, which includes the head/face, both arms, chest, back, abdomen,both legs, genitalia and/or groin buttocks, digits and/or nails, was examined.  - Very faint pink scaly plaques on the bilateral elbows and right knee.   - There are dome shaped bright red papules on the trunk and extremities.   - Multiple regular brown pigmented macules and papules are identified on the trunk and extremities.   - Scattered brown macules on sun exposed areas..   - There are waxy stuck on tan to brown papules on the trunk and extremities.   - No other lesions of concern on areas examined.     Medications:  Current Outpatient Medications   Medication     atorvastatin (LIPITOR) 20 MG tablet     ustekinumab (STELARA) 90 MG/ML     No current facility-administered medications for this visit.      Past Medical History:   There is no problem list on file for this patient.    Past Medical History:   Diagnosis Date     Autoimmune disease (H) 1982    Psoriasis Treated w Stelara     Hyperlipidemia 2011    Atorvastatin 20        CC Referred Self, MD  No address on file on close of this encounter.

## 2021-12-14 ENCOUNTER — ANCILLARY PROCEDURE (OUTPATIENT)
Dept: CT IMAGING | Facility: CLINIC | Age: 56
End: 2021-12-14
Attending: INTERNAL MEDICINE
Payer: COMMERCIAL

## 2021-12-14 ENCOUNTER — TELEPHONE (OUTPATIENT)
Dept: INTERNAL MEDICINE | Facility: CLINIC | Age: 56
End: 2021-12-14

## 2021-12-14 DIAGNOSIS — I77.810 MILD ASCENDING AORTA DILATATION (H): ICD-10-CM

## 2021-12-14 DIAGNOSIS — I77.810 ASCENDING AORTA DILATATION (H): ICD-10-CM

## 2021-12-14 DIAGNOSIS — R16.1 SPLENOMEGALY: Primary | ICD-10-CM

## 2021-12-14 LAB
ATRIAL RATE - MUSE: 59 BPM
DIASTOLIC BLOOD PRESSURE - MUSE: NORMAL MMHG
EXPTIME-PRE: 7.59 SEC
FEF2575-%PRED-PRE: 86 %
FEF2575-PRE: 3.05 L/SEC
FEF2575-PRED: 3.55 L/SEC
FEFMAX-%PRED-PRE: 122 %
FEFMAX-PRE: 12.74 L/SEC
FEFMAX-PRED: 10.42 L/SEC
FEV1-%PRED-PRE: 97 %
FEV1-PRE: 4.12 L
FEV1FEV6-PRE: 75 %
FEV1FEV6-PRED: 80 %
FEV1FVC-PRE: 75 %
FEV1FVC-PRED: 77 %
FIFMAX-PRE: 9.06 L/SEC
FVC-%PRED-PRE: 100 %
FVC-PRE: 5.52 L
FVC-PRED: 5.48 L
INTERPRETATION ECG - MUSE: NORMAL
P AXIS - MUSE: -25 DEGREES
PR INTERVAL - MUSE: 146 MS
QRS DURATION - MUSE: 84 MS
QT - MUSE: 422 MS
QTC - MUSE: 417 MS
R AXIS - MUSE: -6 DEGREES
SYSTOLIC BLOOD PRESSURE - MUSE: NORMAL MMHG
T AXIS - MUSE: 27 DEGREES
VENTRICULAR RATE- MUSE: 59 BPM

## 2021-12-14 PROCEDURE — 71275 CT ANGIOGRAPHY CHEST: CPT | Mod: GC | Performed by: RADIOLOGY

## 2021-12-14 RX ORDER — IOPAMIDOL 755 MG/ML
100 INJECTION, SOLUTION INTRAVASCULAR ONCE
Status: COMPLETED | OUTPATIENT
Start: 2021-12-14 | End: 2021-12-14

## 2021-12-14 RX ADMIN — IOPAMIDOL 100 ML: 755 INJECTION, SOLUTION INTRAVASCULAR at 07:15

## 2021-12-14 NOTE — PROGRESS NOTES
History and Physical Examination     SUBJECTIVE: Chief concern: preventive health review.     Past Medical History:  1.  History of adenomatous polyp (1.2 cm sessile serrated adenoma, 2020); 3-year follow-up recommended at time of diagnosis.  2.  Obstructive sleep apnea with questionable central component, managed with oral appliance.  3.  Dyslipidemia.  4.  Coronary artery disease by coronary calcium CT, 10/2018 (76th percentile).  5.  Psoriasis.  6.  History of latent tuberculosis, status post treatment    Adverse Drug Reactions: None.     Current Medications:  Atorvastatin, 20 mg daily   Ustekinumab, 90 mg/millimeter, 90 mg subcutaneously every 12 weeks.  Tar shampoo, as needed every 2 weeks.  Calcipotriene 0.005% ointment b.i.d. as needed for breakthrough rash.      Habits:  Tobacco: None since 2001; history of 24 pack-years of previous use.  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  Landen was born in [place], [place] of [place], and came to [place] at age 1, later moving with his family to [place].  He graduated from [employer], where he met his wife.  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    Away from work, he enjoys family activities, exercise, and traveling to a home in [place].  He exercises or 30-90 minutes, 6 days per week, typically running, cycling,  "walking, boxing, or working out on a ski ergometer.    Family History:  Father  at age 83, with history of type 2 diabetes mellitus, osteoporosis, peripheral arterial disease, and diverticulitis.  Mother is 78, with history of breast cancer, treated in her 30s.   A brother 15 years his senior is in good health.  A sister 2 years his senior has a history of thyroid (Graves') disease.  A sister 2 years his mayito has endometriosis.  A brother 3 years his mayito has a history of diverticulitis.  A sister 8 years his mayito has a history of ovarian cancer, for which she was treated at age 15; her twin brother is in good health.  Older daughter has a history of strabismus and anxiety; younger daughter is in good health.    Review of Systems: Colonoscopy was completed in ; 3-year follow-up was recommended at that time.  Hepatitis C screen was negative in .  Most recent tetanus (unclear whether Td ord Tdap) was administered in 2011 (or 2012; documentation unclear).  MMR administered in 2012.  First recombinant zoster vaccination administered in 10/5/2021.  Influenza vaccination administered in 10/2021.  Covid 19 vaccination ([employer]) administered on 2021; Moderna Covid booster administered on 10/25/2021.  Remainder of complete review of systems was negative.    OBJECTIVE:     Vital signs: Height 74 inches.  Weight 219 pounds.  Blood pressure 133/85 on average of 3 automated readings.  Heart rate 56.  Respiratory rate 16.  Temperature 98.3 degrees.  O2 saturation 99% on room air.  General: Alert, neatly dressed and groomed, in no acute distress.  HEENT: Atraumatic and normocephalic. Eyelids, pupils, and conjunctivae appeared normal. Lips, teeth and gums appear normal.  Oropharynx showed moist mucous membranes, without exudate or erythema.  Somewhat \"crowded\" soft palate.  Neck: Supple, without thyromegaly, mass, or bruit. No cervical or supraclavicular lymphadenopathy.  Back: No spinal or " "costovertebral angle tenderness.  Chest: Clear to auscultation and percussion. Normal respiratory effort.  Cardiovascular: No jugular venous distention. Regular rate and rhythm, normal S1, S2 without murmur.  Abdomen: Bowel sounds positive; soft, nontender, without rebound, guarding, hepatosplenomegaly or mass.  Extremities: No cyanosis or edema.  Genitalia: Normal male genitalia, without scrotal mass or hernia. No inguinal lymphadenopathy.  Rectal: Normal tone, with smooth, nontender, mildly enlarged prostate. No rectal mass.  Skin: Examination was deferred; full evaluation was completed earlier in day through dermatology clinic  Neurologic: Cranial nerves II-XII were grossly intact. Sensory and motor examinations were normal. Normal gait.  Mini-cog score was 5/5.  Psychiatric: Alert and oriented ×3. Normal affect. Judgment and insight intact.  PHQ-2 score was 0.  NEENA-7 score was 0.    Creatinine 0.97, alkaline phosphatase 53, ALT 44, cholesterol 174, HDL 47, , triglycerides 100, cholesterol/HDL 3.7, PSA 0.43, TSH 1.26, 25-hydroxy vitamin D 24, glucose 99, white blood cell count 5300, hemoglobin 16.8, platelets 166,000, HIV nonreactive, urinalysis unremarkable.    EKG was notable only for sinus bradycardia.  Spirometry showed an FEV1 of 4.12, with an FVC of 5.52; readings were 97% and 100% of predicted values, respectively.    Preliminary DEXA results showed normal bone density.  Body composition analysis showed 24.6% fat (53rd percentile).  BMI was 28.1.    Chest x-ray (arranged at patient request based on previous recommendation and history of treated latent tuberculosis): \"1.  No acute cardiopulmonary processes.  No radiographic evidence of mycobacterial disease.  2.  Question ascending thoracic aortic aneurysm measuring at least 5.2 cm.  Recommend gated chest CT angiogram of the aorta for further evaluation.\"    ASSESSMENT:    1.  Abnormal chest x-ray.  Findings suspicious for ascending aortic aneurysm.  " CT angiogram of aorta will be arranged, as recommended.  Further guidance will be based on results of this study.  Currently he is asymptomatic; we discussed the importance of immediate in the event of chest discomfort, lightheadedness, profound weakness, etc.    2.  Dyslipidemia.  AHA/ACC guidelines suggest a 10-year vascular disease risk of 5.9%; optimal for his cohort is 3.9%.  Initially, we discussed the possibility of adding aspirin based on his known coronary artery disease by CT coronary calcium assessment in 2018.  In the setting of possible ascending aortic aneurysm, he was advised to forego aspirin treatment at this time.  Further guidance will be based on CT results.  We discussed the importance of weight loss, continued regular exercise, and modification of diet, the elements of which were discussed in detail.    3.  Overweight.  We reviewed strategies surrounding modification of diet, along with the importance of continued strength training and the addition of subthreshold exercise.  We discussed the risks associated with overweight with respect to vascular disease and diabetes mellitus.    4.  Sleep apnea.  He had been advised at a previous sleep clinic evaluation to arrange follow-up to monitor response to oral appliance treatment.  I recommended sleep clinic follow-up to discuss management.    5.  Elevated blood pressure.  We reviewed usual goals and lifestyle measures that can help reduce blood pressure, including weight loss; aerobic exercise; avoidance of ibuprofen, naproxen, pseudoephedrine, etc.; reduced consumption of alcohol; and adequate treatment of sleep apnea.  He was advised to monitor and record home blood pressure readings with plan to review with M.D. if average exceeds 130/85.    6.  Preventive care.  I recommended vitamin D3, 50  g, 2 capsules daily for 2 months, then 1 capsule daily.  He was advised to maintaining daily calcium intake of 1200 mg, preferably from dietary sources.  We  reviewed the debate regarding the benefit of multivitamin supplements and the importance of selecting a product that does not contain iron should he choose to proceed.  He was congratulated for his regimen of regular exercise and encouraged to modify his diet and reduce weight.  Tetanus (Tdap) vaccination was provided at the time of his appointment.  He will arrange his second recombinant zoster vaccination through an outside clinic.  We discussed the importance of closely adhering to the recommended timetable for follow-up colonoscopy.    PLAN: See above.     ~SRT    Answers for HPI/ROS submitted by the patient on 12/6/2021  NEENA 7 TOTAL SCORE: 0  General Symptoms: No  Skin Symptoms: No  HENT Symptoms: No  EYE SYMPTOMS: No  HEART SYMPTOMS: No  LUNG SYMPTOMS: No  INTESTINAL SYMPTOMS: No  URINARY SYMPTOMS: No  REPRODUCTIVE SYMPTOMS: No  SKELETAL SYMPTOMS: No  BLOOD SYMPTOMS: No  NERVOUS SYSTEM SYMPTOMS: No  MENTAL HEALTH SYMPTOMS: No

## 2021-12-14 NOTE — CONFIDENTIAL NOTE
Discussed CT aortic angiogram results.  Recommended US of abdomen to address mild splenomegaly, followed by hematology consultation to comment on need for further evaluation and monitoring.      No aneurysm but possible mild dilatation of sinuses of valsalva.  Recommended cardiology consultation to advise on need for further evaluation and monitoring, and to discuss whether he should use aspirin based on (+) coronary calcium CT in 2018.

## 2021-12-16 ENCOUNTER — PATIENT OUTREACH (OUTPATIENT)
Dept: ONCOLOGY | Facility: CLINIC | Age: 56
End: 2021-12-16
Payer: COMMERCIAL

## 2021-12-16 NOTE — PROGRESS NOTES
Received referral for mild splenomegaly, no other imaging or lab abnormalities regarding hematology. Message to Dr Suero to review and see if hematology is needed, any further workup recommended, or if e-consult appropriate. Will update referral for scheduling instructions, if appropriate, as able.

## 2021-12-16 NOTE — PROGRESS NOTES
Per Dr Suero, ok to add for next available with him. Scheduling instructions updated and sent to [facility] for completion.

## 2022-01-18 ENCOUNTER — ANCILLARY PROCEDURE (OUTPATIENT)
Dept: ULTRASOUND IMAGING | Facility: CLINIC | Age: 57
End: 2022-01-18
Attending: INTERNAL MEDICINE
Payer: COMMERCIAL

## 2022-01-18 DIAGNOSIS — R16.1 SPLENOMEGALY: ICD-10-CM

## 2022-01-18 PROCEDURE — 76700 US EXAM ABDOM COMPLETE: CPT | Mod: GC | Performed by: STUDENT IN AN ORGANIZED HEALTH CARE EDUCATION/TRAINING PROGRAM

## 2022-01-19 DIAGNOSIS — R16.1 SPLENOMEGALY: Primary | ICD-10-CM

## 2022-01-19 DIAGNOSIS — N28.1 CYST OF LEFT KIDNEY: ICD-10-CM

## 2022-01-20 ENCOUNTER — TELEPHONE (OUTPATIENT)
Dept: ONCOLOGY | Facility: CLINIC | Age: 57
End: 2022-01-20
Payer: COMMERCIAL

## 2022-01-20 NOTE — TELEPHONE ENCOUNTER
I called the patient and left a voicemail letting him know that his appointment has been changed to a video visit.

## 2022-01-31 DIAGNOSIS — R16.1 SPLENOMEGALY: Primary | ICD-10-CM

## 2022-01-31 DIAGNOSIS — D75.1 ERYTHROCYTOSIS: ICD-10-CM

## 2022-02-02 ENCOUNTER — VIRTUAL VISIT (OUTPATIENT)
Dept: TRANSPLANT | Facility: CLINIC | Age: 57
End: 2022-02-02
Attending: INTERNAL MEDICINE
Payer: COMMERCIAL

## 2022-02-02 DIAGNOSIS — R16.1 SPLENOMEGALY: Primary | ICD-10-CM

## 2022-02-02 DIAGNOSIS — D75.1 ERYTHROCYTOSIS: ICD-10-CM

## 2022-02-02 PROCEDURE — 99204 OFFICE O/P NEW MOD 45 MIN: CPT | Mod: 95 | Performed by: INTERNAL MEDICINE

## 2022-02-02 NOTE — LETTER
2/2/2022         RE: Neel Kim  [address] [address]        Dear Colleague,    Thank you for referring your patient, Neel Kim, to the [facility] [place]. Please see a copy of my visit note below.    Landen is a 56 year old who is being evaluated via a billable video visit.      How would you like to obtain your AVS? MyChart  If the video visit is dropped, the invitation should be resent by: Text to cell phone: [phone]  Will anyone else be joining your video visit? No     Josiane Huerta        Hematology/Oncology Consult Note    Neel Kim MRN# [id number]   Age: 56 year old YOB: 1965          Reason for Consult:   Splenomegaly         Assessment and Plan:   Problem list:   # Psoriasis  # Obstructive Sleep Apnea  # Elevated Hemoglobin  # Hx of Positive Quantiferon TB ,treated for 6 months  # Increased Coronary Artery calcium  # Dyslipidemia  # Right Renal cyst  # Possible Hepatic steatosis  # Hypertension    Neel Kim is a 56 year old delightful gentleman gentleman who had a ultrasound of the abdomen showing the spleen to be 13cm and not enlarged. There were no worrisome lymph nodes, no obvious infections,and no B symptoms. I do wonder whether the there could be any portal hypertension but pt does not abuse alcohol. He does have hypertension, a family hx of DM and dyslipidemia to make me wonder about hepatic steatosis. He is a bit overweight also. I considered that psoriasis and use of a biologic could promote splenomegaly in response to this autoimmune process. I told Mr Kim that I felt this was a benign process and no further evaluation for splenomegaly was needed.  Other issue do need addressing. His hemoglobin is 16.8 which suggest to me that his BARAK is poorly controlled and nocturnal hypoxemia could be increasing Epo. He will see Sleep medicine later this month and I suggest he keep well  hydrated.we discussed blood viscosity and with hypertension, coronary calcifications dyslipidemia I might suggest ASA 81mg/d for cardiovascular protection. He has a renal cyst on ultrasound , possibly related to calculi and this should be addressed.    Recommendations:See Above    I would like to thank Dr Sawant for referring this gentleman to the Hematology Clinic at the [facility]     I spent a total of 60 minutes face to face with Neel Kim during today's office visit. Over 50% of this time was spent counseling the patient and coordinating the care regarding splenomegaly  and 20 minutes preparing to see the patient and  care coordination   Aj Suero MD  [phone]           History of Present Illness:   History obtained from chart review and confirmed with patient.    Neel Kim is a 56 year old who is referred for evaluation of splenomegaly.  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  There was a question on a chest x-ray of a dilated dilated aorta and a CT of the chest was done.  The there was no aortic aneurysmbut it was noted that the spleen was  mildly enlarged ay 15 cm. He has no hx of hepatitis or alcohol abuse, no hx of pancreatitis, no lymphadenopathy, no hx of COVID, no hx of Lyme disease, Cat Scratch,malaria. He had infectious mono as a young man. He has had psoriasis and maybe arthritis and has been on biologics for over 10 years, presently Stelara with a near complete remission of skin lesions. He thinks he had an exposure to TB in 2016 in [place] and had a positive TB test leading to 6 months of treatment.No hx of hemolytic anemia or spherocytosis No fever chills or weight loss. He states there is no family hx of lymphoma but there is breast cancer and ovarian cancer in his family. He had gene testing done at [facility] and no concerning familial cancer genes were found.       Review of Systems:   A comprehensive ROS  was performed with the patient and was found to be negative with the exception of that noted in the HPI above.       Past Medical History:     Past Medical History:   Diagnosis Date     Autoimmune disease (H) 1982    Psoriasis Treated w Stelara     Hyperlipidemia     Atorvastatin 20            Past Surgical History:   No past surgical history on file.         Social History:     Social History     Socioeconomic History     Marital status:      Spouse name: Not on file     Number of children: Not on file     Years of education: Not on file     Highest education level: Not on file   Occupational History     Not on file   Tobacco Use     Smoking status: Former Smoker     Packs/day: 1.00     Years: 24.00     Pack years: 24.00     Types: Cigarettes     Start date: 1980     Quit date: 2001     Years since quittin.1     Smokeless tobacco: Never Used   Substance and Sexual Activity     Alcohol use: Yes     Drug use: Not Currently     Types: Marijuana     Sexual activity: Yes     Partners: Female     Birth control/protection: Female Surgical   Other Topics Concern     Not on file   Social History Narrative     Not on file     Social Determinants of Health     Financial Resource Strain: Not on file   Food Insecurity: Not on file   Transportation Needs: Not on file   Physical Activity: Not on file   Stress: Not on file   Social Connections: Not on file   Intimate Partner Violence: Not At Risk     Fear of Current or Ex-Partner: No     Emotionally Abused: No     Physically Abused: No     Sexually Abused: No   Housing Stability: Not on file            Family History:     Family History   Problem Relation Age of Onset     Breast Cancer Mother      Kidney Cancer Maternal Grandmother      Ovarian Cancer Sister      Diabetes Father      Diabetes Type 1 Father             Allergies:   No Known Allergies         Medications:   (Not in a hospital admission)           Physical Exam:     By the video, he is an  alert gentleman, verbal, in no acute distress.     EYES:  Grossly normal to inspection, no discharge, erythema or icterus.   SKIN:  Visible skin is clear.  No significant rash or abnormal pigmentation.   NEUROLOGIC:  Cranial nerves seem to be intact.  Mentation and speech is appropriate for age.   PSYCHIATRIC:  Mentation appears normal.  He does not seem anxious today.            Data:   I have personally reviewed the following labs/imaging:  CBC@LABRCNTIPR(wbc:4,rbc:4,hgb:4,hct:4,mcv:4,mch:4,mchc:4,rdw:4,plt:4)@  CMP@LABRCNTIPR(na:4,potassium:4,chloride:4,co2:4,aniongap:4,g,bun:4,cr:4,gfrestimated:4,gfrestblack:4,blanca:4,ma,phos:4,prottotal:4,albumin:4,bilitotal:4,alkphos:4,ast:4,alt:4)@  INR@LABRCNTIPR(inr:4)@  EXAMINATION: US ABDOMEN COMPLETE  2022 6:35 AM       CLINICAL HISTORY: Please establish baseline to evaluate mild  splenomegaly seen on CT.  Thank you!; Splenomegaly     COMPARISON: CT 2021         FINDINGS:  The liver is normal in contour and increased in echogenicity. Tiny  cyst in the right lobe of the liver 0in the liver which measures up to  6 mm. There is no intrahepatic or extrahepatic biliary ductal  dilatation. The common bile duct measures 4 millimeter. The  gallbladder is normal, without gallstones, wall thickening, or  pericholecystic fluid.     The spleen measures maximally 13.3 cm and is normal in appearance. The  visualized portions of the pancreas are normal in echogenicity.     The visualized upper abdominal aorta and inferior vena cava are  normal.       The kidneys are normal in position and echogenicity. The right kidney  contains a mid pole cyst which measures 3.4 x 2.4 x 1.6 cm, this cyst  contains numerous hairline septa, no flow and is well-circumscribed.  There is a punctate echogenicity in the left kidney, may represent  nonobstructive calculus/calcification versus prominent sinus fat. The  right kidney measures 12.4 cm and the left kidney measures 13.6 cm.  There  is no significant urinary tract dilation. The urinary bladder is  partial distended and normal in morphology. The bladder wall is  normal.                                                                      IMPRESSION:      1. Minimally complex lesion cyst within the left kidney which measures  up to 3.4 cm. Recommend 6 month follow-up with ultrasound.  2. Spleen is borderline enlarged and measures 13.3 cm.  3. Increased hepatic echogenicity which may be seen with hepatic  steatosis.   CHEST CTA AND 3-D RECONSTRUCTIONS :     CLINICAL HISTORY:  Thoracic aortic aneurysm suspected on chest x-ray.     Comparisons: Chest x-ray 12/13/2021     TECHNIQUE:  Axial images through the chest were obtained before the  administration of intravenous contrast media and following the  injection of contrast media  in the  arterial phase. Source images  were reviewed as well as 3D and multi-planar reconstructions.     FINDINGS:     Vascular     Aorta measurements as follows:  Sinuses of Valsalva: 41 mm diameter  Sinotubular ridge: 31 mm diameter  Ascending aorta: 32 mm diameter  Aortic arch: 33 mm diameter  Descending aorta: 29 mm diameter (proximal)        Normal branching pattern of the great vessels.  Origins of the  brachiocephalic artery, left common carotid artery and left subclavian  artery show no focal abnormality The proximal pulmonary vasculature  appears normal.  The celiac artery and SMA are patent proximally.      Thorax     No hilar, mediastinal or axillary lymphadenopathy is seen. No  pulmonary consolidation or pleural effusion. No suspicious pulmonary  nodule.      Mild splenomegaly measuring up to 15 cm in AP dimension. Small  accessory splenule adjacent to the spleen. The bones show no focal  abnormalities.                                                   IMPRESSION:   1. No thoracic aortic aneurysm or dissection.  2. Mild splenomegaly incidentally noted  Video Start Time: 345            Again, thank you for  allowing me to participate in the care of your patient.      Sincerely,    Aj Suero MD

## 2022-02-02 NOTE — PROGRESS NOTES
Landen is a 56 year old who is being evaluated via a billable video visit.      How would you like to obtain your AVS? MyChart  If the video visit is dropped, the invitation should be resent by: Text to cell phone: [phone]  Will anyone else be joining your video visit? No     Josiane Huerta        Hematology/Oncology Consult Note    Neel Kim MRN# [id number]   Age: 56 year old YOB: 1965          Reason for Consult:   Splenomegaly         Assessment and Plan:   Problem list:   # Psoriasis  # Obstructive Sleep Apnea  # Elevated Hemoglobin  # Hx of Positive Quantiferon TB ,treated for 6 months  # Increased Coronary Artery calcium  # Dyslipidemia  # Right Renal cyst  # Possible Hepatic steatosis  # Hypertension    Neel Kim is a 56 year old delightful gentleman gentleman who had a ultrasound of the abdomen showing the spleen to be 13cm and not enlarged. There were no worrisome lymph nodes, no obvious infections,and no B symptoms. I do wonder whether the there could be any portal hypertension but pt does not abuse alcohol. He does have hypertension, a family hx of DM and dyslipidemia to make me wonder about hepatic steatosis. He is a bit overweight also. I considered that psoriasis and use of a biologic could promote splenomegaly in response to this autoimmune process. I told Mr Kim that I felt this was a benign process and no further evaluation for splenomegaly was needed.  Other issue do need addressing. His hemoglobin is 16.8 which suggest to me that his BARAK is poorly controlled and nocturnal hypoxemia could be increasing Epo. He will see Sleep medicine later this month and I suggest he keep well hydrated.we discussed blood viscosity and with hypertension, coronary calcifications dyslipidemia I might suggest ASA 81mg/d for cardiovascular protection. He has a renal cyst on ultrasound , possibly related to calculi and this should be addressed.    Recommendations:See Above    I would like  to thank Dr Sawant for referring this gentleman to the Hematology Clinic at the [facility]     I spent a total of 60 minutes face to face with Neel Kim during today's office visit. Over 50% of this time was spent counseling the patient and coordinating the care regarding splenomegaly  and 20 minutes preparing to see the patient and  care coordination   Aj Suero MD  [phone]           History of Present Illness:   History obtained from chart review and confirmed with patient.    Neel Kim is a 56 year old who is referred for evaluation of splenomegaly.  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 Neel Sawant for an executive physical.  There was a question on a chest x-ray of a dilated dilated aorta and a CT of the chest was done.  The there was no aortic aneurysmbut it was noted that the spleen was  mildly enlarged ay 15 cm. He has no hx of hepatitis or alcohol abuse, no hx of pancreatitis, no lymphadenopathy, no hx of COVID, no hx of Lyme disease, Cat Scratch,malaria. He had infectious mono as a young man. He has had psoriasis and maybe arthritis and has been on biologics for over 10 years, presently Stelara with a near complete remission of skin lesions. He thinks he had an exposure to TB in 2016 in [place] and had a positive TB test leading to 6 months of treatment.No hx of hemolytic anemia or spherocytosis No fever chills or weight loss. He states there is no family hx of lymphoma but there is breast cancer and ovarian cancer in his family. He had gene testing done at [place] and no concerning familial cancer genes were found.       Review of Systems:   A comprehensive ROS was performed with the patient and was found to be negative with the exception of that noted in the HPI above.       Past Medical History:     Past Medical History:   Diagnosis Date     Autoimmune disease (H) 1982    Psoriasis Treated w Stelara     Hyperlipidemia 2011    Atorvastatin 20             Past Surgical History:   No past surgical history on file.         Social History:     Social History     Socioeconomic History     Marital status:      Spouse name: Not on file     Number of children: Not on file     Years of education: Not on file     Highest education level: Not on file   Occupational History     Not on file   Tobacco Use     Smoking status: Former Smoker     Packs/day: 1.00     Years: 24.00     Pack years: 24.00     Types: Cigarettes     Start date: 1980     Quit date: 2001     Years since quittin.1     Smokeless tobacco: Never Used   Substance and Sexual Activity     Alcohol use: Yes     Drug use: Not Currently     Types: Marijuana     Sexual activity: Yes     Partners: Female     Birth control/protection: Female Surgical   Other Topics Concern     Not on file   Social History Narrative     Not on file     Social Determinants of Health     Financial Resource Strain: Not on file   Food Insecurity: Not on file   Transportation Needs: Not on file   Physical Activity: Not on file   Stress: Not on file   Social Connections: Not on file   Intimate Partner Violence: Not At Risk     Fear of Current or Ex-Partner: No     Emotionally Abused: No     Physically Abused: No     Sexually Abused: No   Housing Stability: Not on file            Family History:     Family History   Problem Relation Age of Onset     Breast Cancer Mother      Kidney Cancer Maternal Grandmother      Ovarian Cancer Sister      Diabetes Father      Diabetes Type 1 Father             Allergies:   No Known Allergies         Medications:   (Not in a hospital admission)           Physical Exam:     By the video, he is an alert gentleman, verbal, in no acute distress.     EYES:  Grossly normal to inspection, no discharge, erythema or icterus.   SKIN:  Visible skin is clear.  No significant rash or abnormal pigmentation.   NEUROLOGIC:  Cranial nerves seem to be intact.  Mentation and speech is appropriate for age.    PSYCHIATRIC:  Mentation appears normal.  He does not seem anxious today.            Data:   I have personally reviewed the following labs/imaging:  CBC@LABRCNTIPR(wbc:4,rbc:4,hgb:4,hct:4,mcv:4,mch:4,mchc:4,rdw:4,plt:4)@  CMP@LABRCNTIPR(na:4,potassium:4,chloride:4,co2:4,aniongap:4,g,bun:4,cr:4,gfrestimated:4,gfrestblack:4,blanca:4,ma,phos:4,prottotal:4,albumin:4,bilitotal:4,alkphos:4,ast:4,alt:4)@  INR@LABRCNTIPR(inr:4)@  EXAMINATION: US ABDOMEN COMPLETE  2022 6:35 AM       CLINICAL HISTORY: Please establish baseline to evaluate mild  splenomegaly seen on CT.  Thank you!; Splenomegaly     COMPARISON: CT 2021         FINDINGS:  The liver is normal in contour and increased in echogenicity. Tiny  cyst in the right lobe of the liver 0in the liver which measures up to  6 mm. There is no intrahepatic or extrahepatic biliary ductal  dilatation. The common bile duct measures 4 millimeter. The  gallbladder is normal, without gallstones, wall thickening, or  pericholecystic fluid.     The spleen measures maximally 13.3 cm and is normal in appearance. The  visualized portions of the pancreas are normal in echogenicity.     The visualized upper abdominal aorta and inferior vena cava are  normal.       The kidneys are normal in position and echogenicity. The right kidney  contains a mid pole cyst which measures 3.4 x 2.4 x 1.6 cm, this cyst  contains numerous hairline septa, no flow and is well-circumscribed.  There is a punctate echogenicity in the left kidney, may represent  nonobstructive calculus/calcification versus prominent sinus fat. The  right kidney measures 12.4 cm and the left kidney measures 13.6 cm.  There is no significant urinary tract dilation. The urinary bladder is  partial distended and normal in morphology. The bladder wall is  normal.                                                                      IMPRESSION:      1. Minimally complex lesion cyst within the left kidney which  measures  up to 3.4 cm. Recommend 6 month follow-up with ultrasound.  2. Spleen is borderline enlarged and measures 13.3 cm.  3. Increased hepatic echogenicity which may be seen with hepatic  steatosis.   CHEST CTA AND 3-D RECONSTRUCTIONS :     CLINICAL HISTORY:  Thoracic aortic aneurysm suspected on chest x-ray.     Comparisons: Chest x-ray 12/13/2021     TECHNIQUE:  Axial images through the chest were obtained before the  administration of intravenous contrast media and following the  injection of contrast media  in the  arterial phase. Source images  were reviewed as well as 3D and multi-planar reconstructions.     FINDINGS:     Vascular     Aorta measurements as follows:  Sinuses of Valsalva: 41 mm diameter  Sinotubular ridge: 31 mm diameter  Ascending aorta: 32 mm diameter  Aortic arch: 33 mm diameter  Descending aorta: 29 mm diameter (proximal)        Normal branching pattern of the great vessels.  Origins of the  brachiocephalic artery, left common carotid artery and left subclavian  artery show no focal abnormality The proximal pulmonary vasculature  appears normal.  The celiac artery and SMA are patent proximally.      Thorax     No hilar, mediastinal or axillary lymphadenopathy is seen. No  pulmonary consolidation or pleural effusion. No suspicious pulmonary  nodule.      Mild splenomegaly measuring up to 15 cm in AP dimension. Small  accessory splenule adjacent to the spleen. The bones show no focal  abnormalities.                                                                            IMPRESSION:   1. No thoracic aortic aneurysm or dissection.  2. Mild splenomegaly incidentally noted  Video Start Time: 345  Video-Visit Details    Type of service:  Video Visit    Video End Time:445    Originating Location (pt. Location): home    Distant Location (provider location):  [facility] [place]     Platform used for Video Visit: [employer]

## 2022-02-16 ENCOUNTER — VIRTUAL VISIT (OUTPATIENT)
Dept: SLEEP MEDICINE | Facility: CLINIC | Age: 57
End: 2022-02-16
Attending: INTERNAL MEDICINE
Payer: COMMERCIAL

## 2022-02-16 VITALS — BODY MASS INDEX: 26.82 KG/M2 | HEIGHT: 74 IN | WEIGHT: 209 LBS

## 2022-02-16 DIAGNOSIS — G47.33 OSA (OBSTRUCTIVE SLEEP APNEA): Primary | ICD-10-CM

## 2022-02-16 PROCEDURE — 99417 PROLNG OP E/M EACH 15 MIN: CPT | Performed by: PHYSICIAN ASSISTANT

## 2022-02-16 PROCEDURE — 99205 OFFICE O/P NEW HI 60 MIN: CPT | Mod: 95 | Performed by: PHYSICIAN ASSISTANT

## 2022-02-16 ASSESSMENT — SLEEP AND FATIGUE QUESTIONNAIRES
HOW LIKELY ARE YOU TO NOD OFF OR FALL ASLEEP WHILE LYING DOWN TO REST IN THE AFTERNOON WHEN CIRCUMSTANCES PERMIT: SLIGHT CHANCE OF DOZING
HOW LIKELY ARE YOU TO NOD OFF OR FALL ASLEEP WHILE SITTING INACTIVE IN A PUBLIC PLACE: WOULD NEVER DOZE
HOW LIKELY ARE YOU TO NOD OFF OR FALL ASLEEP WHILE SITTING AND TALKING TO SOMEONE: WOULD NEVER DOZE
HOW LIKELY ARE YOU TO NOD OFF OR FALL ASLEEP WHILE WATCHING TV: MODERATE CHANCE OF DOZING
HOW LIKELY ARE YOU TO NOD OFF OR FALL ASLEEP IN A CAR, WHILE STOPPED FOR A FEW MINUTES IN TRAFFIC: WOULD NEVER DOZE
HOW LIKELY ARE YOU TO NOD OFF OR FALL ASLEEP WHILE SITTING AND READING: WOULD NEVER DOZE
HOW LIKELY ARE YOU TO NOD OFF OR FALL ASLEEP WHILE SITTING QUIETLY AFTER LUNCH WITHOUT ALCOHOL: WOULD NEVER DOZE
HOW LIKELY ARE YOU TO NOD OFF OR FALL ASLEEP WHEN YOU ARE A PASSENGER IN A CAR FOR AN HOUR WITHOUT A BREAK: SLIGHT CHANCE OF DOZING

## 2022-02-16 NOTE — PATIENT INSTRUCTIONS
Positioning Device  Positioning devices are generally used when sleep apnea is mild and only occurs on your back.  It may be appropriate for those whose sleep study shows milder sleep apnea that occurs primarily when lying flat on one's back. Preliminary studies have shown benefit but effectiveness at home may need to be verified by a home sleep test. These devices are generally not covered by medical insurance.  Examples of devices that maintain sleeping on the back to prevent snoring and mild sleep apnea.    Belt type body positioner  [employer] pillow  -  http://Edgeware.Firmafon/  Sleep Noodle  Slumber Bump     Electronic reminder  http://nightshifttherapy.com/  http://www.VeriFone.com.au/      Your BMI is Body mass index is 26.83 kg/m .  Weight management is a personal decision.  If you are interested in exploring weight loss strategies, the following discussion covers the approaches that may be successful. Body mass index (BMI) is one way to tell whether you are at a healthy weight, overweight, or obese. It measures your weight in relation to your height.  A BMI of 18.5 to 24.9 is in the healthy range. A person with a BMI of 25 to 29.9 is considered overweight, and someone with a BMI of 30 or greater is considered obese. More than two-thirds of American adults are considered overweight or obese.  Being overweight or obese increases the risk for further weight gain. Excess weight may lead to heart disease and diabetes.  Creating and following plans for healthy eating and physical activity may help you improve your health.  Weight control is part of healthy lifestyle and includes exercise, emotional health, and healthy eating habits. Careful eating habits lifelong are the mainstay of weight control. Though there are significant health benefits from weight loss, long-term weight loss with diet alone may be very difficult to achieve- studies show long-term success with dietary management in less than 10% of people. Attaining  a healthy weight may be especially difficult to achieve in those with severe obesity. In some cases, medications, devices and surgical management might be considered.  What can you do?  If you are overweight or obese and are interested in methods for weight loss, you should discuss this with your provider.     Consider reducing daily calorie intake by 500 calories.     Keep a food journal.     Avoiding skipping meals, consider cutting portions instead.    Diet combined with exercise helps maintain muscle while optimizing fat loss. Strength training is particularly important for building and maintaining muscle mass. Exercise helps reduce stress, increase energy, and improves fitness. Increasing exercise without diet control, however, may not burn enough calories to loose weight.       Start walking three days a week 10-20 minutes at a time    Work towards walking thirty minutes five days a week     Eventually, increase the speed of your walking for 1-2 minutes at time    And look into health and wellness programs that may be available through your health insurance provider, employer, local community center, or [employer].

## 2022-02-16 NOTE — PROGRESS NOTES
Landen is a 56 year old who is being evaluated via a billable video visit.      How would you like to obtain your AVS? MyChart  If the video visit is dropped, the invitation should be resent by: Text to cell phone: [phone]  Will anyone else be joining your video visit? No      Video-Visit Details    Type of service:  Video Visit    Video Start Time: 11:05AM    Video End Time:11:56AM    Originating Location (pt. Location): Home    Distant Location (provider location):  [facility]     Platform used for Video Visit: [employer]

## 2022-02-16 NOTE — PROGRESS NOTES
[facility]   Outpatient Sleep Medicine Consultation  Feb 16, 2022       Name: Neel Kim MRN# [id number]   Age: 56 year old YOB: 1965     Date of Consultation: Feb 16, 2022   Consultation is requested by: Neel Sawant MD  [address] [place] [address] [id number]  [place] [address] Neel Sawant  Primary care provider: Paul Wade         Assessment and Plan:   1. BARAK (obstructive sleep apnea)    Patient presents to clinic today to establish care of his moderate obstructive sleep apnea, transition of care from  at [facility]. Initially diagnosed in 2012 through [facility].  During subsequent CPAP titration study there was evidence of treatment emergent central apneas and patient transition to ASV.  Suboptimal tolerance to ASV secondary to mask intolerance and leaking issues.  Patient was transitioned to oral appliance therapy in 2016.  Prior overnight oximetry testing and most recent watchPAT testing from 3/2021 shows mild residual BARAK with his oral appliance.  Per patient his ooral applaince has no further room for advancement.  Primary reason for today's visit is because patient wanted to inquire about his treatment options.  We discussed PAP therapy is most effective treatment option for patients with moderate BARAK.  However, if wanting to transition back to PAP therapy would likely need an updated sleep study and it in his case given report of 10-15 pound weight loss since his initial sleep study in 2012 would be a good idea to update to know current sleep apnea severity. Patient's primary issue with PAP therapy in the past was mask intolerance and though newer styles of mask have likely come out since he last tried PAP, cannot promise him that he would tolerate one of these masks any better. We also discussed that residual mild BARAK is likely not putting him at significant increased cardiovascular risk and so alternatively we could  continue with his current treatment of continued oral appliance with positional restriction to avoid supine sleep, though he understands this is not a perfect fix obviously.  Discussed the option of updating CRISTINA/watchPAT to check in on hypoxemia again but suspect very similar results to his watchPAT in March of last year that showed no significant hypoxemia and agreed to hold off at this time.     Ultimately, patient has elected to continue with his current therapy of oral appliance.  I encouraged him to work on avoiding supine sleep completely and information given in AVS on positional devices. We agreed to have patient return to clinic in 1 year for check in but of course would like to see him sooner with any worsening of symptoms/new developments/if he wants to transition to PAP therapy. Educational materials provided in instructions.       Chief Complaint / Reason for Sleep Consult:     Chief Complaint   Patient presents with     Video Visit     consult currently has dental device          History of Present Illness:     Neel Kim is a 56 year old male who presents to the clinic to establish care of his moderate sleep apnea treated with oral appliance. Other past medical history significant for dyslipidemia, psoriasis, history of latent TB.     We do not have copy of original diagnostic PSG to review today, but per patient was [facility]. Prior clinic notes from Dr. Curtis Putnam at [facility] show date of diagnostic PSG to be 1/9/2012 with AHI 24 events per hour. Patient underwent titration PSG 3/26/2012 (again no study for me to review today) and there was evidence of treatment-emergent central sleep apneas. Initial adequate response to ASV therapy but multiple mask issues including air leak, mouth breathing etc. In 2016 transitioned to oral appliance therapy since intolerant to ASV. PSG completed with oral applaince 12/2016 (again no documentation) showed residual supine disease only so was  recommended to continue with oral appliance along with sleep positional therapy.     Reports multiple CRISTINA's have been done with oral appliance that show borderline hypoxemia. I see dates of 2 CRISTINA's in Dr. Putnam's old notes - one CRISTINA on 10/30/2019 with YAZ of 18.8 events per hour and oxygen saturation below 89% of 4.8 minutes and one CRISTINA 2/16/2021 with YAZ was 13.2 events per hour and has mild and intermittent borderline oxygen desaturations with overall total oxygen saturation below 89% of 9.8 minutes.    Most recent sleep testing was completed in the form of watchPAT home sleep study on 3/13/2021 and report copied below:   SUMMARY:  The patient underwent overnight home sleep testing with a WatchPAT system. He was instructed to wear his oral appliance therapy at the current settings along with trying sleep positional therapy. No sleep log was available for my review. The WatchPAT automatic scoring system estimated a total sleep time of 331.5 minutes of which he spent about 154 minutes on supine position, about 79 minutes in prone position, and 79 minutes on REM sleep. The WatchPAT estimated AHI was 11.2 events per hour which increased to 13 events per hour in the supine position and about 17 events per hour during REM sleep. The WatchPAT estimated RDI was 11.7 events per hour which similarly slightly increased to 13 events per hour in the supine position and about 17 events per hour on REM sleep. There were a few trivial central apnea events scored by this system. His average oxygen saturation was 93% with the lowest of 87%, and he only spent about 0.2 minutes on oxygen saturation equal or below 88% during this study. YAZ was 12.7 events per hour. Loud snoring was noted about 20% of total sleep time.    CLINICAL INTERPRETATION:  Mildly abnormal overnight home sleep testing results with current therapy. History of obstructive sleep apnea with treatment emergent central sleep apnea, treated with oral appliance  "therapy and sleep positional therapy.    Patient presents to my clinic today still using his oral appliance and reports doing his best to stay off his back but no positional device.  Patient reports significant improvement in his sleep when he wears his oral appliance.  No residual snoring with the device.  Without it he wakes himself up snoring and gasping for air.  He reports that it is titrated fully and there is no room for further advancement.  He tolerates well without significant pain/discomfort but is admittedly worried about potential jaw issues in the future with continued use.    His current sleep schedule includes bedtime 10:15 PM with sleep latency of 15-30 minutes and wakes for the day between 545-6:15 AM.  Sleep schedule is the same on all days of the week.  Patient reports that once he wakes up he will \"start working in my head\" and so sleeping on the weekends is not an option.  Might wake to use the restroom in the middle the night or to toss and turn but can generally go back to sleep pretty easily.    No daytime napping. No inadvertent dozing.  Patient states \"I wouldn't fall asleep unless I'm trying to fall asleep I guess but that's not very often\"    Wife reports that he can kick his legs \"every once in a while\" but he denies typical restless leg syndrome symptoms.  No other sleep concerns today.      SCALES       INSOMNIA:  Insomnia severity score: 7/28   absence of insomnia (0-7); sub-threshold insomnia (8-14); moderate insomnia (15-21); and severe insomnia (22-28)       SLEEPINESS: [place] sleepiness scale: 4/24 [normal < 11]          Medications:     Current Outpatient Medications   Medication Sig     atorvastatin (LIPITOR) 20 MG tablet Take 20 mg by mouth     diphenhydrAMINE (BENADRYL) 25 MG tablet Take 1 tablet (25 mg) by mouth every 8 hours as needed for itching or allergies     ustekinumab (STELARA) 90 MG/ML Inject 90 mg Subcutaneous     No current facility-administered medications for " "this visit.             Allergies:     No Known Allergies         Past Medical History:     Past Medical History:   Diagnosis Date     Autoimmune disease (H) 1982    Psoriasis Treated w Stelara     Hyperlipidemia 2011    Atorvastatin 20             Past Surgical History:    Previous upper airway surgery: denies   No past surgical history on file.         Social History:     Social History     Tobacco Use     Smoking status: Former Smoker     Packs/day: 1.00     Years: 24.00     Pack years: 24.00     Types: Cigarettes     Start date: 1980     Quit date: 2001     Years since quittin.1     Smokeless tobacco: Never Used   Substance Use Topics     Alcohol use: Yes     Chemical History:  Alcohol use: Not currently, doing a \"dry 90\"    Tobacco use: Denies   Illicit substances: Occasional CBD only no THC  Caffeine intake: Drinks 4 cups/day of coffee. Last caffeine intake is usually before noon.         Family History:     Family History   Problem Relation Age of Onset     Breast Cancer Mother      Kidney Cancer Maternal Grandmother      Ovarian Cancer Sister      Diabetes Father      Diabetes Type 1 Father      Sleep Apnea Father       Sleep Family Hx: Father with BARAK. Patient denies any known family history of restless legs syndrome, narcolepsy or other sleep disorders.          Physical Examination:   Ht 1.88 m (6' 2\")   Wt 94.8 kg (209 lb)   BMI 26.83 kg/m    General appearance: Awake, alert, cooperative. Well groomed. Sitting comfortably in chair. In no apparent distress.  HEENT: Head: Normocephalic, atraumatic. Eyes:Conjunctiva clear. Sclera normal. Nose: External appearance without deformity.   Neck: No visible thyroid enlargement.   Cardiovascular: No JVD  Pulmonary:  Able to speak easily in full sentences. No cough or wheeze.   Skin:  No rashes or significant lesions on visible skin.   Neurologic: Alert, oriented x3.   Psychiatric: Mood euthymic. Affect congruent with full range and intensity.       "    Data: All pertinent previous laboratory data reviewed     No results found for: PH, PHARTERIAL, PO2, [id number], SAT, PCO2, HCO3, BASEEXCESS, NOEMÍ, BEB  Lab Results   Component Value Date    TSH 1.26 12/13/2021     Lab Results   Component Value Date    GLC 99 12/13/2021     Lab Results   Component Value Date    HGB 16.8 12/13/2021     Lab Results   Component Value Date    CR 0.97 12/13/2021     Lab Results   Component Value Date    ALT 44 12/13/2021    ALKPHOS 53 12/13/2021     No results found for: UAMP, UBARB, BENZODIAZEUR, UCANN, UCOC, OPIT, UPCP    Chest x-ray 12/13/2021:                                              IMPRESSION:   1. No acute cardiopulmonary processes. No radiographic evidence of  mycobacterial disease.  2. Question ascending thoracic aortic aneurysm measuring at least 5.2  cm. Recommend gated chest CT angiogram of the aorta for further    CTA chest 12/14/2021:   IMPRESSION:   1. No thoracic aortic aneurysm or dissection.  2. Mild splenomegaly incidentally noted.     PFT 12/13/2021:   The FVC, FEV1, FEV1/FVC ratio and UFO40-75% are within normal limits.  The inspiratory flow rates are within normal limits.   IMPRESSION:     Normal Spirometry     Copy to: Paul Wade PA-C  Feb 16, 2022     [facility] [place]  [address] [place] [address]     [facility]  [address] [facility] [address] [address]    Chart documentation was completed, in part, with [employer] voice-recognition software. Even though reviewed, some grammatical, spelling, and word errors may remain.    90 minutes spent on day of encounter doing chart review, history and exam, documentation, and further activities as noted above

## 2022-02-17 ENCOUNTER — OFFICE VISIT (OUTPATIENT)
Dept: CARDIOLOGY | Facility: CLINIC | Age: 57
End: 2022-02-17
Payer: COMMERCIAL

## 2022-02-17 VITALS
HEART RATE: 63 BPM | HEIGHT: 74 IN | BODY MASS INDEX: 27.98 KG/M2 | SYSTOLIC BLOOD PRESSURE: 125 MMHG | OXYGEN SATURATION: 99 % | WEIGHT: 218 LBS | DIASTOLIC BLOOD PRESSURE: 84 MMHG

## 2022-02-17 DIAGNOSIS — I77.810 AORTIC ROOT DILATATION (H): Primary | ICD-10-CM

## 2022-02-17 PROCEDURE — 99203 OFFICE O/P NEW LOW 30 MIN: CPT | Performed by: INTERNAL MEDICINE

## 2022-02-17 NOTE — PROGRESS NOTES
HPI and Plan:   Today I had the pleasure of seeing Neel Kim at [facility]. He is a pleasant 56 year old patient with a past medical history of hyperlipidemia presents to the clinic in consultation for abnormal findings on CT of the chest.  The patient had an x-ray chest which reported ascending aortic aneurysm measuring up to 5.2 cm.  This was then followed up with CT angiogram of the chest on which the aorta was found to be normal.  The aortic root at sinus of Valsalva was mildly dilated at 4.1 cm.  Patient has also undergone CT coronary calcium in 2018 at [facility] which showed a total calcium score of 24.  He takes Lipitor for this.  He otherwise is healthy and does not have any complaints.  He denies chest pain, shortness of breath, palpitations, orthopnea, PND, presyncope or syncope    Assessment and plan:  1.  Mild aortic root dilation, 4.1 cm  2.  Mild coronary calcium-CAC 24  3.  Hyperlipidemia-on Lipitor    I discussed the findings of the CT scan with the patient and provided reassurance.  I told him that since he does not have hypertension all we need to do is monitor his blood pressure and make sure it does not significantly elevated.  Other than that all we need to do is perform a transthoracic echocardiogram in 2-3 years to reassess the aortic root.     Thank you for allowing me to participate in the care of Neel Kim    This note was completed in part using Dragon voice recognition software. Although reviewed after completion, some word and grammatical errors may occur.    Brett Kelley MD  Cardiology    Orders Placed This Encounter   Procedures     Follow-Up with Cardiology     Echocardiogram Complete       No orders of the defined types were placed in this encounter.      There are no discontinued medications.    Encounter Diagnosis   Name Primary?     Aortic root dilatation (H) Yes       CURRENT MEDICATIONS:  Current Outpatient Medications   Medication Sig  Dispense Refill     atorvastatin (LIPITOR) 20 MG tablet Take 20 mg by mouth       diphenhydrAMINE (BENADRYL) 25 MG tablet Take 1 tablet (25 mg) by mouth every 8 hours as needed for itching or allergies 1 tablet 0     ustekinumab (STELARA) 90 MG/ML Inject 90 mg Subcutaneous         ALLERGIES   No Known Allergies    PAST MEDICAL HISTORY:  Past Medical History:   Diagnosis Date     Autoimmune disease (H) 1982    Psoriasis Treated w Stelara     Hyperlipidemia 2011    Atorvastatin 20       PAST SURGICAL HISTORY:  History reviewed. No pertinent surgical history.    FAMILY HISTORY:  Family History   Problem Relation Age of Onset     Breast Cancer Mother      Kidney Cancer Maternal Grandmother      Ovarian Cancer Sister      Diabetes Father      Diabetes Type 1 Father      Sleep Apnea Father        SOCIAL HISTORY:  Social History     Socioeconomic History     Marital status:      Spouse name: None     Number of children: None     Years of education: None     Highest education level: None   Occupational History     None   Tobacco Use     Smoking status: Former Smoker     Packs/day: 1.00     Years: 24.00     Pack years: 24.00     Types: Cigarettes     Start date: 1980     Quit date: 2001     Years since quittin.1     Smokeless tobacco: Never Used   Substance and Sexual Activity     Alcohol use: Yes     Drug use: Not Currently     Types: Marijuana     Sexual activity: Yes     Partners: Female     Birth control/protection: Female Surgical   Other Topics Concern     Parent/sibling w/ CABG, MI or angioplasty before 65F 55M? Not Asked   Social History Narrative     None     Social Determinants of Health     Financial Resource Strain: Not on file   Food Insecurity: Not on file   Transportation Needs: Not on file   Physical Activity: Not on file   Stress: Not on file   Social Connections: Not on file   Intimate Partner Violence: Not At Risk     Fear of Current or Ex-Partner: No     Emotionally Abused: No      "Physically Abused: No     Sexually Abused: No   Housing Stability: Not on file       Review of Systems:  Skin:  Positive for   psoriasis - managed with medication   Eyes:  Positive for contacts    ENT:  Negative      Respiratory:  Positive for sleep apnea uses dental device   Cardiovascular:  Negative      Gastroenterology: Negative      Genitourinary:  Negative      Musculoskeletal:  Negative      Neurologic:  Negative      Psychiatric:  Negative      Heme/Lymph/Imm:  Negative      Endocrine:  Negative        Physical Exam:  Vitals: /84   Pulse 63   Ht 1.88 m (6' 2\")   Wt 98.9 kg (218 lb)   SpO2 99%   BMI 27.99 kg/m    Eyes: No icterus.  Pulmonary: Chest symmetric, lungs clear bilaterally and no crackles, wheezes or rales.  Cardiovascular: RRR with normal S1 and S2, no murmur, JVP normal.  Musculoskeletal: Edema of the lower extremities: None.  Neurologic: Oriented and appropriate without obvious focal deficits.   Psychiatric: Normal affect.     Recent Lab Results:  LIPID RESULTS:  Lab Results   Component Value Date    CHOL 174 12/13/2021    HDL 47 12/13/2021     (H) 12/13/2021    TRIG 100 12/13/2021       LIVER ENZYME RESULTS:  Lab Results   Component Value Date    ALT 44 12/13/2021       CBC RESULTS:  Lab Results   Component Value Date    WBC 5.3 12/13/2021    RBC 5.09 12/13/2021    HGB 16.8 12/13/2021    HCT 47.4 12/13/2021    MCV 93 12/13/2021    MCH 33.0 12/13/2021    MCHC 35.4 12/13/2021    RDW 12.5 12/13/2021     12/13/2021       BMP RESULTS:  Lab Results   Component Value Date    GLC 99 12/13/2021    CR 0.97 12/13/2021    GFRESTIMATED 87 12/13/2021        A1C RESULTS:  No results found for: A1C    INR RESULTS:  No results found for: INR    CC  No referring provider defined for this encounter.    All medical records were reviewed in detail and discussed with the patient. Greater than 30 mins were spent with the patient, 50% of this time was spent on counseling and coordination of " care.  After visit summary was printed and given to the patient.

## 2022-02-17 NOTE — LETTER
2/17/2022    Paul Wade  [facility] [address] [facility] [address]    RE: Neel Kim       Dear Colleague,     I had the pleasure of seeing Neel Kim in the [facility].  HPI and Plan:   Today I had the pleasure of seeing Neel Kim at [facility]. He is a pleasant 56 year old patient with a past medical history of hyperlipidemia presents to the clinic in consultation for abnormal findings on CT of the chest.  The patient had an x-ray chest which reported ascending aortic aneurysm measuring up to 5.2 cm.  This was then followed up with CT angiogram of the chest on which the aorta was found to be normal.  The aortic root at sinus of Valsalva was mildly dilated at 4.1 cm.  Patient has also undergone CT coronary calcium in 2018 at [facility] which showed a total calcium score of 24.  He takes Lipitor for this.  He otherwise is healthy and does not have any complaints.  He denies chest pain, shortness of breath, palpitations, orthopnea, PND, presyncope or syncope    Assessment and plan:  1.  Mild aortic root dilation, 4.1 cm  2.  Mild coronary calcium-CAC 24  3.  Hyperlipidemia-on Lipitor    I discussed the findings of the CT scan with the patient and provided reassurance.  I told him that since he does not have hypertension all we need to do is monitor his blood pressure and make sure it does not significantly elevated.  Other than that all we need to do is perform a transthoracic echocardiogram in 2-3 years to reassess the aortic root.     Thank you for allowing me to participate in the care of Neel Kim    This note was completed in part using Dragon voice recognition software. Although reviewed after completion, some word and grammatical errors may occur.    Brett Kelley MD  Cardiology    Orders Placed This Encounter   Procedures     Follow-Up with Cardiology     Echocardiogram Complete       No orders of the defined types were  placed in this encounter.      There are no discontinued medications.    Encounter Diagnosis   Name Primary?     Aortic root dilatation (H) Yes       CURRENT MEDICATIONS:  Current Outpatient Medications   Medication Sig Dispense Refill     atorvastatin (LIPITOR) 20 MG tablet Take 20 mg by mouth       diphenhydrAMINE (BENADRYL) 25 MG tablet Take 1 tablet (25 mg) by mouth every 8 hours as needed for itching or allergies 1 tablet 0     ustekinumab (STELARA) 90 MG/ML Inject 90 mg Subcutaneous         ALLERGIES   No Known Allergies    PAST MEDICAL HISTORY:  Past Medical History:   Diagnosis Date     Autoimmune disease (H)     Psoriasis Treated w Stelara     Hyperlipidemia 2011    Atorvastatin 20       PAST SURGICAL HISTORY:  History reviewed. No pertinent surgical history.    FAMILY HISTORY:  Family History   Problem Relation Age of Onset     Breast Cancer Mother      Kidney Cancer Maternal Grandmother      Ovarian Cancer Sister      Diabetes Father      Diabetes Type 1 Father      Sleep Apnea Father        SOCIAL HISTORY:  Social History     Socioeconomic History     Marital status:      Spouse name: None     Number of children: None     Years of education: None     Highest education level: None   Occupational History     None   Tobacco Use     Smoking status: Former Smoker     Packs/day: 1.00     Years: 24.00     Pack years: 24.00     Types: Cigarettes     Start date: 1980     Quit date: 2001     Years since quittin.1     Smokeless tobacco: Never Used   Substance and Sexual Activity     Alcohol use: Yes     Drug use: Not Currently     Types: Marijuana     Sexual activity: Yes     Partners: Female     Birth control/protection: Female Surgical   Other Topics Concern     Parent/sibling w/ CABG, MI or angioplasty before 65F 55M? Not Asked   Social History Narrative     None     Social Determinants of Health     Financial Resource Strain: Not on file   Food Insecurity: Not on file   Transportation  "Needs: Not on file   Physical Activity: Not on file   Stress: Not on file   Social Connections: Not on file   Intimate Partner Violence: Not At Risk     Fear of Current or Ex-Partner: No     Emotionally Abused: No     Physically Abused: No     Sexually Abused: No   Housing Stability: Not on file       Review of Systems:  Skin:  Positive for   psoriasis - managed with medication   Eyes:  Positive for contacts    ENT:  Negative      Respiratory:  Positive for sleep apnea uses dental device   Cardiovascular:  Negative      Gastroenterology: Negative      Genitourinary:  Negative      Musculoskeletal:  Negative      Neurologic:  Negative      Psychiatric:  Negative      Heme/Lymph/Imm:  Negative      Endocrine:  Negative        Physical Exam:  Vitals: /84   Pulse 63   Ht 1.88 m (6' 2\")   Wt 98.9 kg (218 lb)   SpO2 99%   BMI 27.99 kg/m    Eyes: No icterus.  Pulmonary: Chest symmetric, lungs clear bilaterally and no crackles, wheezes or rales.  Cardiovascular: RRR with normal S1 and S2, no murmur, JVP normal.  Musculoskeletal: Edema of the lower extremities: None.  Neurologic: Oriented and appropriate without obvious focal deficits.   Psychiatric: Normal affect.     Recent Lab Results:  LIPID RESULTS:  Lab Results   Component Value Date    CHOL 174 12/13/2021    HDL 47 12/13/2021     (H) 12/13/2021    TRIG 100 12/13/2021       LIVER ENZYME RESULTS:  Lab Results   Component Value Date    ALT 44 12/13/2021       CBC RESULTS:  Lab Results   Component Value Date    WBC 5.3 12/13/2021    RBC 5.09 12/13/2021    HGB 16.8 12/13/2021    HCT 47.4 12/13/2021    MCV 93 12/13/2021    MCH 33.0 12/13/2021    MCHC 35.4 12/13/2021    RDW 12.5 12/13/2021     12/13/2021       BMP RESULTS:  Lab Results   Component Value Date    GLC 99 12/13/2021    CR 0.97 12/13/2021    GFRESTIMATED 87 12/13/2021        A1C RESULTS:  No results found for: A1C    INR RESULTS:  No results found for: INR    CC  No referring provider " defined for this encounter.    All medical records were reviewed in detail and discussed with the patient. Greater than 30 mins were spent with the patient, 50% of this time was spent on counseling and coordination of care.  After visit summary was printed and given to the patient.      Thank you for allowing me to participate in the care of your patient.      Sincerely,     Brett Kelley MD     [facility]  cc:   No referring provider defined for this encounter.

## 2022-06-21 DIAGNOSIS — E78.5 DYSLIPIDEMIA: Primary | ICD-10-CM

## 2022-06-21 RX ORDER — ATORVASTATIN CALCIUM 20 MG/1
20 TABLET, FILM COATED ORAL DAILY
Qty: 90 TABLET | Refills: 2 | Status: SHIPPED | OUTPATIENT
Start: 2022-06-21 | End: 2023-11-21

## 2022-09-17 ENCOUNTER — HEALTH MAINTENANCE LETTER (OUTPATIENT)
Age: 57
End: 2022-09-17

## 2022-10-20 DIAGNOSIS — Z22.7 LTBI (LATENT TUBERCULOSIS INFECTION): Primary | ICD-10-CM

## 2022-10-20 DIAGNOSIS — C80.1: ICD-10-CM

## 2022-10-28 ENCOUNTER — E-VISIT (OUTPATIENT)
Dept: URGENT CARE | Facility: CLINIC | Age: 57
End: 2022-10-28

## 2022-10-28 ENCOUNTER — VIRTUAL VISIT (OUTPATIENT)
Dept: FAMILY MEDICINE | Facility: CLINIC | Age: 57
End: 2022-10-28
Payer: COMMERCIAL

## 2022-10-28 DIAGNOSIS — R19.7 DIARRHEA, UNSPECIFIED TYPE: Primary | ICD-10-CM

## 2022-10-28 DIAGNOSIS — Z53.9 ERRONEOUS ENCOUNTER--DISREGARD: ICD-10-CM

## 2022-10-28 DIAGNOSIS — Z11.59 NEED FOR HEPATITIS C SCREENING TEST: Primary | ICD-10-CM

## 2022-10-28 PROCEDURE — 99207 PR NON-BILLABLE SERV PER CHARTING: CPT | Performed by: INTERNAL MEDICINE

## 2022-10-28 NOTE — PATIENT INSTRUCTIONS
Dear Neel Kim,    We are sorry you are not feeling well. Based on the responses you provided, you may be experiencing a serious health condition that needs immediate in-person attention. It is recommended that you be seen in the emergency room in order to better evaluate your symptoms. Please click here to find the nearest Emergency Room.     Angela Rangel MD

## 2022-10-28 NOTE — PROGRESS NOTES
"Landne is a 57 year old who is being evaluated via a billable video visit.      How would you like to obtain your AVS? MyChart  If the video visit is dropped, the invitation should be resent by: Text to cell phone: [phone]  Will anyone else be joining your video visit? No  {If patient encounters technical issues they should call [phone] :832655}        {PROVIDER CHARTING PREFERENCE:586712}    Subjective   Landen is a 57 year old, presenting for the following health issues:  Diarrhea (2 weeks)      HPI     {SUPERLIST (Optional):100164}  {additonal problems for provider to add (Optional):631743}    Review of Systems   {ROS COMP (Optional):697727}      Objective           Vitals:  No vitals were obtained today due to virtual visit.    Physical Exam   {video visit exam brief selected:835374::\"GENERAL: Healthy, alert and no distress\",\"EYES: Eyes grossly normal to inspection.  No discharge or erythema, or obvious scleral/conjunctival abnormalities.\",\"RESP: No audible wheeze, cough, or visible cyanosis.  No visible retractions or increased work of breathing.  \",\"SKIN: Visible skin clear. No significant rash, abnormal pigmentation or lesions.\",\"NEURO: Cranial nerves grossly intact.  Mentation and speech appropriate for age.\",\"PSYCH: Mentation appears normal, affect normal/bright, judgement and insight intact, normal speech and appearance well-groomed.\"}    {Diagnostic Test Results (Optional):653603}    {AMBULATORY ATTESTATION (Optional):106255}        Video-Visit Details    Video Start Time: {video visit start/end time for provider to select:019076}    Type of service:  Video Visit    Video End Time:{video visit start/end time for provider to select:497723}    Originating Location (pt. Location): {video visit patient location:247019::\"Home\"}    {PROVIDER LOCATION On-site should be selected for visits conducted from your clinic location or adjoining [facility] hospital, academic office, or other nearby [facility] building. " "Off-site should be selected for all other provider locations, including home:272516}    Distant Location (provider location):  {virtual location provider:337188}    Platform used for Video Visit: {Virtual Visit Platforms:254777::\"AmWell\"}    "

## 2022-10-28 NOTE — PROGRESS NOTES
This encounter was opened in error. Please disregard.  Pt not responding to Invite on Amwell and not answering the phone. LVM.

## 2022-10-31 ENCOUNTER — APPOINTMENT (OUTPATIENT)
Dept: LAB | Facility: CLINIC | Age: 57
End: 2022-10-31
Payer: COMMERCIAL

## 2022-10-31 ENCOUNTER — TELEPHONE (OUTPATIENT)
Dept: GASTROENTEROLOGY | Facility: CLINIC | Age: 57
End: 2022-10-31

## 2022-10-31 DIAGNOSIS — R19.7 DIARRHEA OF PRESUMED INFECTIOUS ORIGIN: Primary | ICD-10-CM

## 2022-10-31 NOTE — TELEPHONE ENCOUNTER
Screening Questions  BLUE  KIND OF PREP RED  LOCATION [review exclusion criteria] GREEN  SEDATION TYPE        Y Are you active on mychart?       Neel Sawant MD in [facility] IM SIGNATURE PROGRAM Ordering/Referring Provider?        BCBS What type of coverage do you have?      N Have you had a positive covid test in the last 90 days?     28.0 1. BMI  [BMI 40+ - review exclusion criteria]    Y  2. Are you able to give consent for your medical care? [IF NO,RN REVIEW]        N  3. Are you taking any prescription pain medications on a routine schedule?      N  3a. EXTENDED PREP What kind of prescription?     N 4. Do you have any chemical dependencies such as alcohol, street drugs, or methadone?    N 5. Do you have any history of post-traumatic stress syndrome, severe anxiety or history of psychosis?      **If yes 3- 5 , please schedule with MAC sedation.**          IF YES TO ANY 6 - 10 - HOSPITAL SETTING ONLY.     N 6.   Do you need assistance transferring?     N 7.   Have you had a heart or lung transplant?    N 8.   Are you currently on dialysis?   N 9.   Do you use daily home oxygen?   N 10. Do you take nitroglycerin?   10a. N If yes, how often?     11. [FEMALES]  N Are you currently pregnant?    11a. N If yes, how many weeks? [ Greater than 12 weeks, OR NEEDED]    N 12. Do you have Pulmonary Hypertension? *NEED PAC APPT AT [facility]*     N 13. [review exclusion criteria]  Do you have any implantable devices in your body (pacemaker, defib, LVAD)?    N 14. In the past 6 months, have you had any heart related issues including cardiomyopathy or heart attack?     14a. N If yes, did it require cardiac stenting if so when?     N 15. Have you had a stroke or Transient ischemic attack (TIA - aka  mini stroke ) within 6 months?      N 16. Do you have mod to severe Obstructive Sleep Apnea?  [Hospital only - Ok at [facility]]    N 17. Do you have SEVERE AND UNCONTROLLED asthma? *NEED PAC APPT AT [facility]*     N 18. Are you  "currently taking any blood thinners?     18a. If yes, inform patient to \"follow up w/ ordering provider for bridging instructions.\"    N 19. Do you take the medication Phentermine?    19a. If yes, \"Hold for 7 days before procedure.  Please consult your prescribing provider if you have questions about holding this medication.\"     N  20. Do you have chronic kidney disease?      N  21. Do you have a diagnosis of diabetes?     N  22. On a regular basis do you go 3-5 days between bowel movements?      23. Preferred LOCAL Pharmacy for Pre Prescription    [ LIST ONLY ONE PHARMACY]     [id number] - [facility] [address]    - CLOSING REMINDERS -    Informed patient they will need an adult    Cannot take any type of public or medical transportation alone    Conscious Sedation- Needs  for 6 hours after the procedure       MAC/General-Needs  for 24 hours after procedure    Pre-Procedure Covid test to be completed [[facility] PCR Testing Required]    Confirmed Nurse will call to complete assessment       - SCHEDULING DETAILS -     Brenda  Surgeon    12/21     Date of Procedure  Lower Endoscopy [Colonoscopy]  Type of Procedure Scheduled   RH Location  [facility] PREP-If you answer yes to questions #8, #20, #21Which Colonoscopy Prep was Sent?     moderate Sedation Type     n PAC / Pre-op Required       Additional comments:  PT REQUESTED [facility] instead of [facility]          "

## 2022-11-01 ENCOUNTER — LAB (OUTPATIENT)
Dept: LAB | Facility: CLINIC | Age: 57
End: 2022-11-01
Payer: COMMERCIAL

## 2022-11-01 DIAGNOSIS — R19.7 DIARRHEA OF PRESUMED INFECTIOUS ORIGIN: ICD-10-CM

## 2022-11-01 PROCEDURE — 87209 SMEAR COMPLEX STAIN: CPT

## 2022-11-01 PROCEDURE — 87177 OVA AND PARASITES SMEARS: CPT

## 2022-11-01 PROCEDURE — 87506 IADNA-DNA/RNA PROBE TQ 6-11: CPT

## 2022-11-02 LAB
O+P STL MICRO: NEGATIVE
TRI STN SPEC: NORMAL

## 2022-11-10 ENCOUNTER — HOSPITAL ENCOUNTER (OUTPATIENT)
Dept: GENERAL RADIOLOGY | Facility: CLINIC | Age: 57
Discharge: HOME | End: 2022-11-10
Attending: INTERNAL MEDICINE | Admitting: INTERNAL MEDICINE
Payer: COMMERCIAL

## 2022-11-10 DIAGNOSIS — Z22.7 LTBI (LATENT TUBERCULOSIS INFECTION): ICD-10-CM

## 2022-11-10 PROCEDURE — 71046 X-RAY EXAM CHEST 2 VIEWS: CPT

## 2022-11-21 ENCOUNTER — DOCUMENTATION ONLY (OUTPATIENT)
Dept: LAB | Facility: CLINIC | Age: 57
End: 2022-11-21

## 2022-11-22 ENCOUNTER — DOCUMENTATION ONLY (OUTPATIENT)
Dept: LAB | Facility: CLINIC | Age: 57
End: 2022-11-22

## 2022-11-23 RX ORDER — BISACODYL 5 MG
TABLET, DELAYED RELEASE (ENTERIC COATED) ORAL
Qty: 4 TABLET | Refills: 0 | Status: SHIPPED | OUTPATIENT
Start: 2022-11-23 | End: 2023-11-21

## 2022-11-25 ENCOUNTER — LAB (OUTPATIENT)
Dept: LAB | Facility: CLINIC | Age: 57
End: 2022-11-25
Payer: COMMERCIAL

## 2022-11-25 DIAGNOSIS — L40.0 PSORIASIS VULGARIS: ICD-10-CM

## 2022-11-25 DIAGNOSIS — L40.0 PSORIASIS VULGARIS: Primary | ICD-10-CM

## 2022-11-25 LAB
ALBUMIN SERPL BCG-MCNC: 4.4 G/DL (ref 3.5–5.2)
ALP SERPL-CCNC: 50 U/L (ref 40–129)
ALT SERPL W P-5'-P-CCNC: 184 U/L (ref 10–50)
ANION GAP SERPL CALCULATED.3IONS-SCNC: 12 MMOL/L (ref 7–15)
AST SERPL W P-5'-P-CCNC: 77 U/L (ref 10–50)
BASOPHILS # BLD AUTO: 0 10E3/UL (ref 0–0.2)
BASOPHILS NFR BLD AUTO: 1 %
BILIRUB DIRECT SERPL-MCNC: <0.2 MG/DL (ref 0–0.3)
BILIRUB SERPL-MCNC: 0.6 MG/DL
BUN SERPL-MCNC: 10.6 MG/DL (ref 6–20)
CALCIUM SERPL-MCNC: 9.4 MG/DL (ref 8.6–10)
CHLORIDE SERPL-SCNC: 103 MMOL/L (ref 98–107)
CHOLEST SERPL-MCNC: 196 MG/DL
CREAT SERPL-MCNC: 0.96 MG/DL (ref 0.67–1.17)
DEPRECATED HCO3 PLAS-SCNC: 24 MMOL/L (ref 22–29)
EOSINOPHIL # BLD AUTO: 0.2 10E3/UL (ref 0–0.7)
EOSINOPHIL NFR BLD AUTO: 4 %
ERYTHROCYTE [DISTWIDTH] IN BLOOD BY AUTOMATED COUNT: 12.4 % (ref 10–15)
GFR SERPL CREATININE-BSD FRML MDRD: >90 ML/MIN/1.73M2
GLUCOSE SERPL-MCNC: 86 MG/DL (ref 70–99)
HBV CORE AB SERPL QL IA: NONREACTIVE
HBV CORE IGM SERPL QL IA: NONREACTIVE
HBV SURFACE AB SERPL IA-ACNC: 0.93 M[IU]/ML
HBV SURFACE AB SERPL IA-ACNC: NONREACTIVE M[IU]/ML
HBV SURFACE AG SERPL QL IA: NONREACTIVE
HCT VFR BLD AUTO: 48.7 % (ref 40–53)
HCV AB SERPL QL IA: NONREACTIVE
HDLC SERPL-MCNC: 34 MG/DL
HGB BLD-MCNC: 17.5 G/DL (ref 13.3–17.7)
IMM GRANULOCYTES # BLD: 0 10E3/UL
IMM GRANULOCYTES NFR BLD: 0 %
LDLC SERPL CALC-MCNC: 145 MG/DL
LYMPHOCYTES # BLD AUTO: 1.5 10E3/UL (ref 0.8–5.3)
LYMPHOCYTES NFR BLD AUTO: 31 %
MCH RBC QN AUTO: 32.6 PG (ref 26.5–33)
MCHC RBC AUTO-ENTMCNC: 35.9 G/DL (ref 31.5–36.5)
MCV RBC AUTO: 91 FL (ref 78–100)
MONOCYTES # BLD AUTO: 0.6 10E3/UL (ref 0–1.3)
MONOCYTES NFR BLD AUTO: 13 %
NEUTROPHILS # BLD AUTO: 2.6 10E3/UL (ref 1.6–8.3)
NEUTROPHILS NFR BLD AUTO: 52 %
NONHDLC SERPL-MCNC: 162 MG/DL
PLATELET # BLD AUTO: 171 10E3/UL (ref 150–450)
POTASSIUM SERPL-SCNC: 4.2 MMOL/L (ref 3.4–5.3)
PROT SERPL-MCNC: 6.9 G/DL (ref 6.4–8.3)
RBC # BLD AUTO: 5.36 10E6/UL (ref 4.4–5.9)
SODIUM SERPL-SCNC: 139 MMOL/L (ref 136–145)
TRIGL SERPL-MCNC: 86 MG/DL
WBC # BLD AUTO: 4.9 10E3/UL (ref 4–11)

## 2022-11-25 PROCEDURE — 80053 COMPREHEN METABOLIC PANEL: CPT

## 2022-11-25 PROCEDURE — 86705 HEP B CORE ANTIBODY IGM: CPT

## 2022-11-25 PROCEDURE — 86803 HEPATITIS C AB TEST: CPT

## 2022-11-25 PROCEDURE — 99000 SPECIMEN HANDLING OFFICE-LAB: CPT

## 2022-11-25 PROCEDURE — 87340 HEPATITIS B SURFACE AG IA: CPT

## 2022-11-25 PROCEDURE — 86704 HEP B CORE ANTIBODY TOTAL: CPT

## 2022-11-25 PROCEDURE — 86706 HEP B SURFACE ANTIBODY: CPT

## 2022-11-25 PROCEDURE — 86689 HTLV/HIV CONFIRMJ ANTIBODY: CPT | Mod: 90

## 2022-11-25 PROCEDURE — 80061 LIPID PANEL: CPT

## 2022-11-25 PROCEDURE — 82248 BILIRUBIN DIRECT: CPT

## 2022-11-25 PROCEDURE — 85025 COMPLETE CBC W/AUTO DIFF WBC: CPT

## 2022-11-25 PROCEDURE — 36415 COLL VENOUS BLD VENIPUNCTURE: CPT

## 2022-11-25 PROCEDURE — 86481 TB AG RESPONSE T-CELL SUSP: CPT

## 2022-11-26 LAB
GAMMA INTERFERON BACKGROUND BLD IA-ACNC: 0.33 IU/ML
M TB IFN-G BLD-IMP: NEGATIVE
M TB IFN-G CD4+ BCKGRND COR BLD-ACNC: 9.67 IU/ML
MITOGEN IGNF BCKGRD COR BLD-ACNC: -0.02 IU/ML
MITOGEN IGNF BCKGRD COR BLD-ACNC: -0.1 IU/ML
QUANTIFERON MITOGEN: 10 IU/ML
QUANTIFERON NIL TUBE: 0.33 IU/ML
QUANTIFERON TB1 TUBE: 0.31 IU/ML
QUANTIFERON TB2 TUBE: 0.23

## 2022-11-30 LAB — HIV1 AB SERPL QL IB: NEGATIVE

## 2022-12-21 ENCOUNTER — HOSPITAL ENCOUNTER (OUTPATIENT)
Facility: CLINIC | Age: 57
Discharge: HOME | End: 2022-12-21
Attending: INTERNAL MEDICINE | Admitting: INTERNAL MEDICINE
Payer: COMMERCIAL

## 2022-12-21 VITALS
HEART RATE: 62 BPM | BODY MASS INDEX: 26.95 KG/M2 | OXYGEN SATURATION: 96 % | RESPIRATION RATE: 18 BRPM | WEIGHT: 210 LBS | DIASTOLIC BLOOD PRESSURE: 94 MMHG | HEIGHT: 74 IN | SYSTOLIC BLOOD PRESSURE: 142 MMHG

## 2022-12-21 DIAGNOSIS — Z12.11 SPECIAL SCREENING FOR MALIGNANT NEOPLASMS, COLON: Primary | ICD-10-CM

## 2022-12-21 LAB — COLONOSCOPY: NORMAL

## 2022-12-21 PROCEDURE — G0105 COLORECTAL SCRN; HI RISK IND: HCPCS | Performed by: INTERNAL MEDICINE

## 2022-12-21 PROCEDURE — 45378 DIAGNOSTIC COLONOSCOPY: CPT | Performed by: INTERNAL MEDICINE

## 2022-12-21 PROCEDURE — 250N000011 HC RX IP 250 OP 636: Performed by: INTERNAL MEDICINE

## 2022-12-21 PROCEDURE — G0500 MOD SEDAT ENDO SERVICE >5YRS: HCPCS | Performed by: INTERNAL MEDICINE

## 2022-12-21 PROCEDURE — 250N000013 HC RX MED GY IP 250 OP 250 PS 637: Performed by: INTERNAL MEDICINE

## 2022-12-21 RX ORDER — ONDANSETRON 4 MG/1
4 TABLET, ORALLY DISINTEGRATING ORAL EVERY 6 HOURS PRN
Status: DISCONTINUED | OUTPATIENT
Start: 2022-12-21 | End: 2022-12-21 | Stop reason: HOSPADM

## 2022-12-21 RX ORDER — EPINEPHRINE 1 MG/ML
0.1 INJECTION, SOLUTION INTRAMUSCULAR; SUBCUTANEOUS
Status: DISCONTINUED | OUTPATIENT
Start: 2022-12-21 | End: 2022-12-21 | Stop reason: HOSPADM

## 2022-12-21 RX ORDER — FENTANYL CITRATE 50 UG/ML
50-100 INJECTION, SOLUTION INTRAMUSCULAR; INTRAVENOUS EVERY 5 MIN PRN
Status: DISCONTINUED | OUTPATIENT
Start: 2022-12-21 | End: 2022-12-21 | Stop reason: HOSPADM

## 2022-12-21 RX ORDER — ONDANSETRON 2 MG/ML
4 INJECTION INTRAMUSCULAR; INTRAVENOUS EVERY 6 HOURS PRN
Status: DISCONTINUED | OUTPATIENT
Start: 2022-12-21 | End: 2022-12-21 | Stop reason: HOSPADM

## 2022-12-21 RX ORDER — FLUMAZENIL 0.1 MG/ML
0.2 INJECTION, SOLUTION INTRAVENOUS
Status: DISCONTINUED | OUTPATIENT
Start: 2022-12-21 | End: 2022-12-21 | Stop reason: HOSPADM

## 2022-12-21 RX ORDER — SIMETHICONE 40MG/0.6ML
133 SUSPENSION, DROPS(FINAL DOSAGE FORM)(ML) ORAL
Status: COMPLETED | OUTPATIENT
Start: 2022-12-21 | End: 2022-12-21

## 2022-12-21 RX ORDER — ONDANSETRON 2 MG/ML
4 INJECTION INTRAMUSCULAR; INTRAVENOUS
Status: DISCONTINUED | OUTPATIENT
Start: 2022-12-21 | End: 2022-12-21 | Stop reason: HOSPADM

## 2022-12-21 RX ORDER — LIDOCAINE 40 MG/G
CREAM TOPICAL
Status: DISCONTINUED | OUTPATIENT
Start: 2022-12-21 | End: 2022-12-21 | Stop reason: HOSPADM

## 2022-12-21 RX ORDER — NALOXONE HYDROCHLORIDE 0.4 MG/ML
0.4 INJECTION, SOLUTION INTRAMUSCULAR; INTRAVENOUS; SUBCUTANEOUS
Status: DISCONTINUED | OUTPATIENT
Start: 2022-12-21 | End: 2022-12-21 | Stop reason: HOSPADM

## 2022-12-21 RX ORDER — NALOXONE HYDROCHLORIDE 0.4 MG/ML
0.2 INJECTION, SOLUTION INTRAMUSCULAR; INTRAVENOUS; SUBCUTANEOUS
Status: DISCONTINUED | OUTPATIENT
Start: 2022-12-21 | End: 2022-12-21 | Stop reason: HOSPADM

## 2022-12-21 RX ORDER — DIPHENHYDRAMINE HYDROCHLORIDE 50 MG/ML
25-50 INJECTION INTRAMUSCULAR; INTRAVENOUS
Status: DISCONTINUED | OUTPATIENT
Start: 2022-12-21 | End: 2022-12-21 | Stop reason: HOSPADM

## 2022-12-21 RX ORDER — ATROPINE SULFATE 0.1 MG/ML
1 INJECTION INTRAVENOUS
Status: DISCONTINUED | OUTPATIENT
Start: 2022-12-21 | End: 2022-12-21 | Stop reason: HOSPADM

## 2022-12-21 RX ORDER — PROCHLORPERAZINE MALEATE 10 MG
10 TABLET ORAL EVERY 6 HOURS PRN
Status: DISCONTINUED | OUTPATIENT
Start: 2022-12-21 | End: 2022-12-21 | Stop reason: HOSPADM

## 2022-12-21 RX ADMIN — FENTANYL CITRATE 100 MCG: 50 INJECTION, SOLUTION INTRAMUSCULAR; INTRAVENOUS at 11:21

## 2022-12-21 RX ADMIN — SIMETHICONE 133 MG: 20 EMULSION ORAL at 11:33

## 2022-12-21 RX ADMIN — MIDAZOLAM HYDROCHLORIDE 2 MG: 1 INJECTION, SOLUTION INTRAMUSCULAR; INTRAVENOUS at 11:21

## 2022-12-21 ASSESSMENT — ACTIVITIES OF DAILY LIVING (ADL): ADLS_ACUITY_SCORE: 35

## 2022-12-21 NOTE — PRE-PROCEDURE
Pre-Endoscopy History and Physical     Neel Kim MRN# [id number]   YOB: 1965 Age: 57 year old     Date of Procedure: 12/21/2022  Primary care provider: Paul Wade  Type of Endoscopy: colonoscopy  Reason for Procedure: f/u polyps  Type of Anesthesia Anticipated: Moderate (conscious) sedation    HPI:    Neel is a 57 year old male who will be undergoing the above procedure.      A history and physical has been performed. The patient's medications and allergies have been reviewed. The risks and benefits of the procedure and the sedation options and risks were discussed with the patient.  All questions were answered and informed consent was obtained.      No Known Allergies     Current Facility-Administered Medications   Medication     0.9% sodium chloride BOLUS     atropine injection 1 mg     benzocaine 20% (HURRICAINE/TOPEX) 20 % spray 0.5 mL     diphenhydrAMINE (BENADRYL) injection 25-50 mg     EPINEPHrine (Anaphylaxis) (ADRENALIN) injection (vial) 0.1 mg     fentaNYL (PF) (SUBLIMAZE) injection  mcg     flumazenil (ROMAZICON) injection 0.2 mg     glucagon injection 0.5 mg     lidocaine (LMX4) kit     lidocaine 1 % 0.1-1 mL     midazolam (VERSED) injection 0.5-2 mg     naloxone (NARCAN) injection 0.2 mg    Or     naloxone (NARCAN) injection 0.4 mg    Or     naloxone (NARCAN) injection 0.2 mg    Or     naloxone (NARCAN) injection 0.4 mg     ondansetron (ZOFRAN) injection 4 mg     simethicone (MYLICON) suspension 133 mg     sodium chloride (PF) 0.9% PF flush 3 mL     sodium chloride (PF) 0.9% PF flush 3 mL     sodium chloride (PF) 0.9% PF flush 3 mL       Patient Active Problem List   Diagnosis     Cyst of left kidney        Past Medical History:   Diagnosis Date     Autoimmune disease (H) 1982    Psoriasis Treated w Stelara     Hyperlipidemia 2011    Atorvastatin 20        History reviewed. No pertinent surgical history.    Social History     Tobacco Use     Smoking status: Former      "Packs/day: 1.00     Years: 24.00     Pack years: 24.00     Types: Cigarettes     Start date: 1980     Quit date: 2001     Years since quittin.9     Smokeless tobacco: Never   Substance Use Topics     Alcohol use: Yes     Comment: 5 per week       Family History   Problem Relation Age of Onset     Breast Cancer Mother      Diabetes Father      Diabetes Type 1 Father      Sleep Apnea Father      Kidney Cancer Maternal Grandmother      Ovarian Cancer Sister      Colon Cancer No family hx of             Medications:     Medications Prior to Admission   Medication Sig Dispense Refill Last Dose     atorvastatin (LIPITOR) 20 MG tablet Take 1 tablet (20 mg) by mouth daily 90 tablet 2      diphenhydrAMINE (BENADRYL) 25 MG tablet Take 1 tablet (25 mg) by mouth every 8 hours as needed for itching or allergies 1 tablet 0      ustekinumab (STELARA) 90 MG/ML Inject 90 mg Subcutaneous          Scheduled Medications:    sodium chloride (PF)  3 mL Intracatheter Q8H       PRN:  sodium chloride 0.9%, atropine, benzocaine 20%, diphenhydrAMINE, EPINEPHrine, fentaNYL, flumazenil, glucagon, lidocaine 4%, lidocaine (buffered or not buffered), midazolam, naloxone **OR** naloxone **OR** naloxone **OR** naloxone, ondansetron, simethicone, sodium chloride (PF), sodium chloride (PF)    PHYSICAL EXAM:   Ht 1.88 m (6' 2\")   Wt 95.3 kg (210 lb)   BMI 26.96 kg/m   Estimated body mass index is 26.96 kg/m  as calculated from the following:    Height as of this encounter: 1.88 m (6' 2\").    Weight as of this encounter: 95.3 kg (210 lb).   RESP: lungs clear to auscultation - no rales, rhonchi or wheezes  CV: regular rates and rhythm    IMPRESSION   ASA Class 2 - Mild systemic disease      Signed Electronically by: Francisco Gutierrez MD  2022    .            "

## 2023-01-23 ENCOUNTER — HEALTH MAINTENANCE LETTER (OUTPATIENT)
Age: 58
End: 2023-01-23

## 2023-01-28 DIAGNOSIS — D75.1 ERYTHROCYTOSIS: Primary | ICD-10-CM

## 2023-11-15 ENCOUNTER — VIRTUAL VISIT (OUTPATIENT)
Dept: INTERNAL MEDICINE | Facility: CLINIC | Age: 58
End: 2023-11-15

## 2023-11-15 DIAGNOSIS — Z00.00 ENCOUNTER FOR PREVENTIVE HEALTH EXAMINATION: ICD-10-CM

## 2023-11-15 DIAGNOSIS — Z00.00 ENCOUNTER FOR PREVENTIVE HEALTH EXAMINATION: Primary | ICD-10-CM

## 2023-11-15 DIAGNOSIS — Z00.00 VISIT FOR PREVENTIVE HEALTH EXAMINATION: ICD-10-CM

## 2023-11-15 DIAGNOSIS — Z22.7 LTBI (LATENT TUBERCULOSIS INFECTION): Primary | ICD-10-CM

## 2023-11-15 PROCEDURE — 99207 PR NO CHARGE LOS: CPT

## 2023-11-15 NOTE — PROGRESS NOTES
Health Maintenance:  Do you have a PCP? Yes  When was your last visit with your PCP?   When was your last eye exam? 6 months  Have you ever had a colonoscopy? Yes   If yes, when? 12/21/22  Have you ever had any polyps removed? No    As part of your visit we will set up a DEXA scan which will measure your body composition. We have a few questions that need to be answered before we can schedule this scan:   What is your approximate weight? 210   Have you ever had a DEXA scan within the past 2 years? Yes   Will you have any other imaging studies with contrast (x-ray, CT scan) within 7  days of this appointment? No   Have you had any spine or hip surgery? No   Do you take any vitamins that contain calcium or antacids with calcium? Yes    If yes, stop taking 24 hours prior to visit.     Goals for the Visit:  Thorough Comprehensive Preventive Exam  TB Chest xray  Pertinent past Medical/Family and Social HX:   Pertinent sx that desire are addressed with this visit:     Instructions prior to appointment:   1. Fast beginning at 10 pm for lab appointment  2. If your preventive care assessment package includes a Fitness Assessment, please bring athletic shoes. Complementary [facility] fitness attire is provided and yours to keep.  3. If eye exam, eyes may be dilated, it will last 4-6 hours, may want to bring sunglasses.   4. May bring laptop or other work materials for use during downtime.   5. You will receive an email about 3 days prior to your visit with a final itinerary, menu selections for the complementary breakfast and lunch and instructions for the visit.     Complimentary  Parking provided. Drop off car in front of [facility], take the patient elevators to the [facility]. When you enter in the lobby, identify yourself as an [facility] [atient and you will be escorted up to the clinic.   If questions arise prior to your appointment please contact  the [facility] at [phone].

## 2023-11-21 ENCOUNTER — OFFICE VISIT (OUTPATIENT)
Dept: INTERNAL MEDICINE | Facility: CLINIC | Age: 58
End: 2023-11-21
Payer: COMMERCIAL

## 2023-11-21 ENCOUNTER — LAB (OUTPATIENT)
Dept: INTERNAL MEDICINE | Facility: CLINIC | Age: 58
End: 2023-11-21
Payer: COMMERCIAL

## 2023-11-21 ENCOUNTER — ANCILLARY PROCEDURE (OUTPATIENT)
Dept: BONE DENSITY | Facility: CLINIC | Age: 58
End: 2023-11-21
Payer: COMMERCIAL

## 2023-11-21 ENCOUNTER — APPOINTMENT (OUTPATIENT)
Dept: INTERNAL MEDICINE | Facility: CLINIC | Age: 58
End: 2023-11-21
Payer: COMMERCIAL

## 2023-11-21 ENCOUNTER — OFFICE VISIT (OUTPATIENT)
Dept: PULMONOLOGY | Facility: CLINIC | Age: 58
End: 2023-11-21
Payer: COMMERCIAL

## 2023-11-21 ENCOUNTER — ANCILLARY PROCEDURE (OUTPATIENT)
Dept: GENERAL RADIOLOGY | Facility: CLINIC | Age: 58
End: 2023-11-21
Attending: INTERNAL MEDICINE
Payer: COMMERCIAL

## 2023-11-21 VITALS
RESPIRATION RATE: 16 BRPM | OXYGEN SATURATION: 98 % | TEMPERATURE: 98.3 F | HEIGHT: 74 IN | BODY MASS INDEX: 27.59 KG/M2 | SYSTOLIC BLOOD PRESSURE: 133 MMHG | WEIGHT: 215 LBS | HEART RATE: 65 BPM | DIASTOLIC BLOOD PRESSURE: 92 MMHG

## 2023-11-21 DIAGNOSIS — M21.612 BUNION, LEFT: ICD-10-CM

## 2023-11-21 DIAGNOSIS — D75.1 ERYTHROCYTOSIS: ICD-10-CM

## 2023-11-21 DIAGNOSIS — Z00.00 VISIT FOR PREVENTIVE HEALTH EXAMINATION: ICD-10-CM

## 2023-11-21 DIAGNOSIS — Z00.00 ENCOUNTER FOR PREVENTIVE HEALTH EXAMINATION: ICD-10-CM

## 2023-11-21 DIAGNOSIS — R53.83 FATIGUE, UNSPECIFIED TYPE: ICD-10-CM

## 2023-11-21 DIAGNOSIS — Z22.7 LTBI (LATENT TUBERCULOSIS INFECTION): ICD-10-CM

## 2023-11-21 DIAGNOSIS — Z00.00 ENCOUNTER FOR PREVENTIVE HEALTH EXAMINATION: Primary | ICD-10-CM

## 2023-11-21 DIAGNOSIS — M25.511 RIGHT SHOULDER PAIN, UNSPECIFIED CHRONICITY: ICD-10-CM

## 2023-11-21 DIAGNOSIS — E78.5 DYSLIPIDEMIA: ICD-10-CM

## 2023-11-21 LAB
ALBUMIN UR-MCNC: NEGATIVE MG/DL
ALP SERPL-CCNC: 49 U/L (ref 40–150)
ALT SERPL W P-5'-P-CCNC: 25 U/L (ref 0–70)
APPEARANCE UR: CLEAR
BASOPHILS # BLD AUTO: 0 10E3/UL (ref 0–0.2)
BASOPHILS NFR BLD AUTO: 1 %
BILIRUB UR QL STRIP: NEGATIVE
CHOLEST SERPL-MCNC: 201 MG/DL
COLOR UR AUTO: NORMAL
CREAT SERPL-MCNC: 0.97 MG/DL (ref 0.67–1.17)
EGFRCR SERPLBLD CKD-EPI 2021: 90 ML/MIN/1.73M2
EOSINOPHIL # BLD AUTO: 0.2 10E3/UL (ref 0–0.7)
EOSINOPHIL NFR BLD AUTO: 3 %
ERYTHROCYTE [DISTWIDTH] IN BLOOD BY AUTOMATED COUNT: 12.5 % (ref 10–15)
FASTING STATUS PATIENT QL REPORTED: YES
GLUCOSE SERPL-MCNC: 105 MG/DL (ref 70–99)
GLUCOSE UR STRIP-MCNC: NEGATIVE MG/DL
HCT VFR BLD AUTO: 53.5 % (ref 40–53)
HDLC SERPL-MCNC: 50 MG/DL
HGB BLD-MCNC: 19.5 G/DL (ref 13.3–17.7)
HGB UR QL STRIP: NEGATIVE
HIV 1+2 AB+HIV1 P24 AG SERPL QL IA: NONREACTIVE
HOLD SPECIMEN: NORMAL
HOLD SPECIMEN: NORMAL
IMM GRANULOCYTES # BLD: 0 10E3/UL
IMM GRANULOCYTES NFR BLD: 0 %
INTERPRETATION: NORMAL
KETONES UR STRIP-MCNC: NEGATIVE MG/DL
LDLC SERPL CALC-MCNC: 133 MG/DL
LEUKOCYTE ESTERASE UR QL STRIP: NEGATIVE
LYMPHOCYTES # BLD AUTO: 1.9 10E3/UL (ref 0.8–5.3)
LYMPHOCYTES NFR BLD AUTO: 31 %
MCH RBC QN AUTO: 34 PG (ref 26.5–33)
MCHC RBC AUTO-ENTMCNC: 36.4 G/DL (ref 31.5–36.5)
MCV RBC AUTO: 93 FL (ref 78–100)
MONOCYTES # BLD AUTO: 0.7 10E3/UL (ref 0–1.3)
MONOCYTES NFR BLD AUTO: 11 %
NEUTROPHILS # BLD AUTO: 3.4 10E3/UL (ref 1.6–8.3)
NEUTROPHILS NFR BLD AUTO: 54 %
NITRATE UR QL: NEGATIVE
NONHDLC SERPL-MCNC: 151 MG/DL
NRBC # BLD AUTO: 0 10E3/UL
NRBC BLD AUTO-RTO: 0 /100
PH UR STRIP: 7 [PH] (ref 5–7)
PLATELET # BLD AUTO: 192 10E3/UL (ref 150–450)
PSA SERPL DL<=0.01 NG/ML-MCNC: 0.52 NG/ML (ref 0–3.5)
RBC # BLD AUTO: 5.74 10E6/UL (ref 4.4–5.9)
RBC URINE: <1 /HPF
SIGNIFICANT RESULTS: NORMAL
SP GR UR STRIP: 1 (ref 1–1.03)
SPECIMEN DESCRIPTION: NORMAL
TEST DETAILS, MDL: NORMAL
TRIGL SERPL-MCNC: 91 MG/DL
TSH SERPL DL<=0.005 MIU/L-ACNC: 1.18 UIU/ML (ref 0.3–4.2)
UROBILINOGEN UR STRIP-MCNC: NORMAL MG/DL
VIT D+METAB SERPL-MCNC: 61 NG/ML (ref 20–50)
WBC # BLD AUTO: 6.2 10E3/UL (ref 4–11)
WBC URINE: <1 /HPF

## 2023-11-21 PROCEDURE — 84443 ASSAY THYROID STIM HORMONE: CPT | Performed by: PATHOLOGY

## 2023-11-21 PROCEDURE — 71046 X-RAY EXAM CHEST 2 VIEWS: CPT | Mod: GC | Performed by: RADIOLOGY

## 2023-11-21 PROCEDURE — G0103 PSA SCREENING: HCPCS | Performed by: PATHOLOGY

## 2023-11-21 PROCEDURE — 36415 COLL VENOUS BLD VENIPUNCTURE: CPT | Performed by: PATHOLOGY

## 2023-11-21 PROCEDURE — 93000 ELECTROCARDIOGRAM COMPLETE: CPT | Performed by: INTERNAL MEDICINE

## 2023-11-21 PROCEDURE — 99396 PREV VISIT EST AGE 40-64: CPT | Performed by: INTERNAL MEDICINE

## 2023-11-21 PROCEDURE — 77080 DXA BONE DENSITY AXIAL: CPT | Performed by: INTERNAL MEDICINE

## 2023-11-21 PROCEDURE — 82565 ASSAY OF CREATININE: CPT | Performed by: PATHOLOGY

## 2023-11-21 PROCEDURE — 84075 ASSAY ALKALINE PHOSPHATASE: CPT | Performed by: PATHOLOGY

## 2023-11-21 PROCEDURE — 85025 COMPLETE CBC W/AUTO DIFF WBC: CPT | Performed by: PATHOLOGY

## 2023-11-21 PROCEDURE — 84403 ASSAY OF TOTAL TESTOSTERONE: CPT | Performed by: INTERNAL MEDICINE

## 2023-11-21 PROCEDURE — G0452 MOLECULAR PATHOLOGY INTERPR: HCPCS | Mod: 26 | Performed by: STUDENT IN AN ORGANIZED HEALTH CARE EDUCATION/TRAINING PROGRAM

## 2023-11-21 PROCEDURE — 80061 LIPID PANEL: CPT | Performed by: PATHOLOGY

## 2023-11-21 PROCEDURE — 81270 JAK2 GENE: CPT | Performed by: INTERNAL MEDICINE

## 2023-11-21 PROCEDURE — 84460 ALANINE AMINO (ALT) (SGPT): CPT | Performed by: PATHOLOGY

## 2023-11-21 PROCEDURE — 99207 PR NO CHARGE LOS: CPT

## 2023-11-21 PROCEDURE — 81339 MPL GENE SEQ ALYS EXON 10: CPT | Performed by: INTERNAL MEDICINE

## 2023-11-21 PROCEDURE — 81001 URINALYSIS AUTO W/SCOPE: CPT | Performed by: PATHOLOGY

## 2023-11-21 PROCEDURE — 87389 HIV-1 AG W/HIV-1&-2 AB AG IA: CPT | Performed by: INTERNAL MEDICINE

## 2023-11-21 PROCEDURE — 94375 RESPIRATORY FLOW VOLUME LOOP: CPT | Performed by: INTERNAL MEDICINE

## 2023-11-21 PROCEDURE — 82947 ASSAY GLUCOSE BLOOD QUANT: CPT | Performed by: PATHOLOGY

## 2023-11-21 PROCEDURE — 99000 SPECIMEN HANDLING OFFICE-LAB: CPT | Performed by: PATHOLOGY

## 2023-11-21 PROCEDURE — 82306 VITAMIN D 25 HYDROXY: CPT | Performed by: INTERNAL MEDICINE

## 2023-11-21 PROCEDURE — 96999 UNLISTED SPEC DERM SVC/PX: CPT | Performed by: INTERNAL MEDICINE

## 2023-11-21 RX ORDER — ATORVASTATIN CALCIUM 20 MG/1
20 TABLET, FILM COATED ORAL DAILY
Qty: 90 TABLET | Refills: 3 | Status: SHIPPED | OUTPATIENT
Start: 2023-11-21

## 2023-11-21 ASSESSMENT — PAIN SCALES - GENERAL: PAINLEVEL: NO PAIN (1)

## 2023-11-21 NOTE — PROGRESS NOTES
Neel Kim comes into clinic today at the request of Dr. LUCY Sawant Ordering Provider for EKG.    This service provided today was under the supervising provider of the day Dr. LUCY Sawant, who was available if needed.    Uyen Snowden, [facility]

## 2023-11-21 NOTE — NURSING NOTE
AHA BP    1st   147/91  2nd  136/86  3rd   133/92    Average   139/90  Uyen Rebollar CMA 12:32 PM on 11/21/2023

## 2023-11-21 NOTE — LETTER
"11/21/2023       RE: Neel Kim  [address] [address]     Dear Colleague,    Thank you for referring your patient, Neel Kim, to the [facility]. Please see a copy of my visit note below.    History and Physical Examination     SUBJECTIVE: Chief concern: preventive health review.     Past Medical History:  1.  History of adenomatous polyp (1.2 cm sessile serrated adenoma, 2020); 3-year follow-up recommended at time of diagnosis.  2.  Obstructive sleep apnea with questionable central component, managed with oral appliance.  3.  Dyslipidemia.  4.  Coronary artery disease by coronary calcium CT, 10/2018 (76th percentile).  5.  Psoriasis.  6.  History of latent tuberculosis, status post treatment     Adverse Drug Reactions: None.     Current Medications:  Atorvastatin, 20 mg, 1/2 tablet daily   Ustekinumab, 90 mg/millimeter, 90 mg subcutaneously every 12 weeks.  Tar shampoo, as needed every 2 weeks.  Calcipotriene 0.005% ointment b.i.d. as needed for breakthrough rash.   Aterosil (rhamnan sulfate) supplement, 1 capsule daily  Natural (porcine) Thyroid, (?)60 mcg daily  Testosterone supplement, preparation type and amount unknown  Vitamin D3, 125 mcg daily     Habits:  Tobacco: None since 2001; history of 24 pack-years of previous use.  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  Landen was born in [place], [place], and came to [place] at age 1, later moving with his family to [place].  He graduated from [employer] " "[employer], where he met his wife.  In , he moved to the 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  Away from work, he enjoys family activities, exercise, and traveling to a home in [place].  He exercises by walking his dogs for 3-4 miles per day in addition to 30 minutes of running, cycling, boxing, or Cross-Fit exercise on most days.     Family History:  Father  at age 83, with history of type 2 diabetes mellitus, osteoporosis, peripheral arterial disease, and diverticulitis.  Mother is 80, with history of breast cancer, treated in her 30s.   A brother 15 years his senior is in good health.  A sister 2 years his senior has a history of thyroid (Graves') disease.  A sister 2 years his mayito has endometriosis.  A brother 3 years his mayito has a history of diverticulitis.  A sister 8 years his mayito has a history of ovarian cancer, for which she was treated at age 15; her twin brother is in good health.  Older daughter has a history of strabismus and anxiety; younger daughter is in good health.     Review of Systems:  While lifting weights overhead 2 months ago, Landen heard a \"pop\" and noted the onset of right anterior shoulder pain, which has recurred intermittently since.  He notes prominence of the left great toe MTP, with mild pain when wearing snug-fitting shoes; no associated erythema or warmth.  History of \"mild\" obstructive sleep apnea, without ongoing treatment; frequent awakenings with unrefreshed sensation upon awakening without daytime somnolence.  Colonoscopy was completed in 2022; 5-year follow-up was recommended at that time.  Hepatitis C screen was negative in .  Most recent tetanus (unclear whether Td ord Tdap) was administered in 2011 (or 2012; documentation unclear).  MMR administered in 2012.  Recombinant zoster vaccination series " "was completed in 1/2022.  Covid 19 vaccination ([place]) administered on 4/6/2021; Moderna Covid vaccinations were administered on 10/25/2021, 5/21/2022, and 10/11/2022.  Tdap was administered in 12/2021.  He is scheduled for influenza and Covid vaccinations in the next week.  Remainder of complete review of systems was negative.     OBJECTIVE:     Vital signs: Height 74 inches.  Weight 215 pounds.  Blood pressure 139/90 on average of 3 automated readings.  Heart rate 65.  Respiratory rate 16.  Temperature 98.3 degrees.  O2 saturation 98% on room air.  General: Alert, neatly dressed and groomed, in no acute distress.  HEENT: Atraumatic and normocephalic. Eyelids, pupils, and conjunctivae appeared normal. Lips, teeth and gums appear normal.  Oropharynx showed moist mucous membranes, without exudate or erythema.  \"Crowded\" soft palate with somewhat narrow airway.  Neck: Supple, without thyromegaly, mass, or bruit. No cervical or supraclavicular lymphadenopathy.  Back: No spinal or costovertebral angle tenderness.  Chest: Clear to auscultation and percussion. Normal respiratory effort.  Cardiovascular: No jugular venous distention. Regular rate and rhythm, normal S1, S2 without murmur.  Abdomen: Bowel sounds positive; soft, nontender, without rebound, guarding, hepatosplenomegaly or mass.  Extremities: No cyanosis or edema.  Right shoulder examination showed normal rotator cuff strength.  No clavicle, AC joint, or bicipital groove tenderness.  Pain was elicited at 90 degrees of active and 120 degrees of passive abduction.  Genitalia: Normal male genitalia, without scrotal mass or hernia. No inguinal lymphadenopathy.  Rectal: Normal tone, with smooth, nontender, mildly enlarged prostate. No rectal mass.  Skin: Examination was deferred; full evaluation was completed earlier in day through dermatology clinic  Neurologic: Cranial nerves II-XII were grossly intact. Sensory and motor examinations were normal. Normal gait.  " "Mini-cog score was 4/5; 5/5 with minimal prompting.  Psychiatric: Alert and oriented ×3. Normal affect. Judgment and insight intact.  PHQ-2 score was 0.       Creatinine 0.97, alkaline phosphatase 49, ALT 25, cholesterol 201, HDL 50, , triglycerides 91, cholesterol/HDL 4.0, PSA 0.52, TSH 1.18, 25-hydroxy vitamin D 61, glucose 105, white blood cell count 6200, hemoglobin 19.5, platelets 192,000, hematocrit 53.5, MCV 93, MCH 34.0 (normal range 26.5-33), MCHC 36.4, RDW 12.5, HIV nonreactive, urinalysis unremarkable.  JAK2 with reflex to MPL CALR MPN FOC NGS results pending.     EKG was unremarkable.  Spirometry showed an FEV1 of 4.20, with an FVC of 5.53; readings were 109% and 111% of predicted values, respectively.     DEXA results showed normal bone density, with most negative and valid T-score of -0.4 at the left femoral neck.      Body composition analysis showed 28.3% fat (19th percentile); body mass index was 27.6.  Readings from 12/2021 showed 24.6% fat (53rd percentile).       Chest x-ray (arranged at patient request based on previous recommendation and history of treated latent tuberculosis): \"No acute airspace disease. No radiographic evidence of tuberculosis infection.\"    ASSESSMENT:     1.  Erythrocytosis.  Previously noted to have splenomegaly on an incidental finding.  JAK2 mutation testing was recommended, but not pursued.  We agreed that he would proceed with this testing, discontinue use of testosterone in consultation with his outside prescribing provider, and pursue further evaluation based on test results.  In addition, I recommend clinic consultation to determine the status of obstructive sleep apnea and to pursue treatment as indicated.    2.  Dyslipidemia.  AHA/ACC guidelines suggest a 10-year vascular disease risk of 8.4%; optimal for his cohort is 4.8%.  After beginning a supplement that can impact cholesterol, he chose to reduce his dose of atorvastatin.  Based on current readings, I " recommended that he resume previous dose of atorvastatin at 20 mg daily at bedtime and that he had low-strength (81 mg) aspirin, based on an approximate extrapolation that would suggest his 10-year risk with exceed 10% if not using a statin.  We discussed the importance of weight loss, continued regular exercise, and modification of diet, the elements of which were discussed in detail.     3.  Impaired fasting glucose.  We discussed the nature of this condition along with importance of regular exercise, maintenance of ideal weight, and adherence to a healthful diet.  He was advised to arrange measurement of fasting glucose levels at least annually.     4.  Sleep apnea.  I recommended sleep clinic follow-up to discuss management, especially given current elevated blood pressure and erythrocytosis.     5.  Elevated blood pressure.  We reviewed usual goals and lifestyle measures that can help reduce blood pressure, including weight loss; aerobic exercise; avoidance of ibuprofen, naproxen, pseudoephedrine, etc.; reduced consumption of alcohol; and adequate treatment of sleep apnea.  He was advised to monitor and record home blood pressure readings with plan to review with M.D. if average exceeds 130/85.     6.  Overweight.  Dramatically improved body fat percentage relative to 2021, possibly related to testosterone use.  In the setting of erythrocytosis, I recommended that he wean off of testosterone, while continuing a regimen of regular exercise and following an appropriate diet.  We reviewed additional diet and exercise strategies for facilitating reduction of body fat and weight, including the potential benefit of 16/8 time-restricted eating.    7.  Bunion.  Examination of the left great toe was notable for prominence of the MTP joint, without warmth or erythema.  No significant dislocation appreciated.  We discussed the importance of using shoes with a wide toe box.  He requested further evaluation through  orthopedics to discuss long-term management options.    8.  Shoulder pain.  Examination is suggestive of a rotator cuff tendinopathy.  Based on his persistent symptoms, I recommended physical therapy evaluation and treatment.    9.  Preventive care.  I recommended reducing daily dose of vitamin D3 supplements to 50 mcg (currently 125 mcg).  He was advised to maintain daily calcium intake of 1200 mg, preferably from dietary sources.  He was congratulated for his regimen of regular exercise and encouraged to modify his diet and maintain ideal weight.  Colonoscopy was completed in 12/2022.  I recommended influenza, Covid, and hepatitis B vaccination series, which he elected to pursue on his on.  He was advised to discontinue use of thyroid supplements, given that pre-treatment TSH levels were normal.       PLAN: See above.     ~SRT    Again, thank you for allowing me to participate in the care of your patient.      Sincerely,    Neel Sawant MD

## 2023-11-21 NOTE — NURSING NOTE
Chief Complaint   Patient presents with    Physical     Patient is here for annual physical     Uyen Rebollar CMA 7:39 AM on 11/21/2023

## 2023-11-22 LAB
ATRIAL RATE - MUSE: 62 BPM
DIASTOLIC BLOOD PRESSURE - MUSE: NORMAL MMHG
EXPTIME-PRE: 7.01 SEC
FEF2575-%PRED-PRE: 103 %
FEF2575-PRE: 3.3 L/SEC
FEF2575-PRED: 3.19 L/SEC
FEFMAX-%PRED-PRE: 128 %
FEFMAX-PRE: 13.15 L/SEC
FEFMAX-PRED: 10.24 L/SEC
FEV1-%PRED-PRE: 109 %
FEV1-PRE: 4.2 L
FEV1FEV6-PRE: 76 %
FEV1FEV6-PRED: 79 %
FEV1FVC-PRE: 76 %
FEV1FVC-PRED: 78 %
FIFMAX-PRE: 9.88 L/SEC
FVC-%PRED-PRE: 111 %
FVC-PRE: 5.53 L
FVC-PRED: 4.94 L
INTERPRETATION ECG - MUSE: NORMAL
P AXIS - MUSE: 57 DEGREES
PR INTERVAL - MUSE: 152 MS
QRS DURATION - MUSE: 84 MS
QT - MUSE: 406 MS
QTC - MUSE: 412 MS
R AXIS - MUSE: -17 DEGREES
SYSTOLIC BLOOD PRESSURE - MUSE: NORMAL MMHG
T AXIS - MUSE: 18 DEGREES
VENTRICULAR RATE- MUSE: 62 BPM

## 2023-11-22 NOTE — PROGRESS NOTES
"History and Physical Examination     SUBJECTIVE: Chief concern: preventive health review.     Past Medical History:  1.  History of adenomatous polyp (1.2 cm sessile serrated adenoma, 2020); 3-year follow-up recommended at time of diagnosis.  2.  Obstructive sleep apnea with questionable central component, managed with oral appliance.  3.  Dyslipidemia.  4.  Coronary artery disease by coronary calcium CT, 10/2018 (76th percentile).  5.  Psoriasis.  6.  History of latent tuberculosis, status post treatment     Adverse Drug Reactions: None.     Current Medications:  Atorvastatin, 20 mg, 1/2 tablet daily   Ustekinumab, 90 mg/millimeter, 90 mg subcutaneously every 12 weeks.  Tar shampoo, as needed every 2 weeks.  Calcipotriene 0.005% ointment b.i.d. as needed for breakthrough rash.   Aterosil (rhamnan sulfate) supplement, 1 capsule daily  Natural (porcine) Thyroid, (?)60 mcg daily  Testosterone supplement, preparation type and amount unknown  Vitamin D3, 125 mcg daily     Habits:  Tobacco: None since 2001; history of 24 pack-years of previous use.  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  Landen was born in [place], [place] north of [place], and came to [place] at age 1, later moving with his family to [place].  He graduated from [employer], where he met his wife.  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  Away from work, he enjoys family activities, exercise, and traveling to a home in " "[place].  He exercises by walking his dogs for 3-4 miles per day in addition to 30 minutes of running, cycling, boxing, or Cross-Fit exercise on most days.     Family History:  Father  at age 83, with history of type 2 diabetes mellitus, osteoporosis, peripheral arterial disease, and diverticulitis.  Mother is 80, with history of breast cancer, treated in her 30s.   A brother 15 years his senior is in good health.  A sister 2 years his senior has a history of thyroid (Graves') disease.  A sister 2 years his mayito has endometriosis.  A brother 3 years his mayito has a history of diverticulitis.  A sister 8 years his mayito has a history of ovarian cancer, for which she was treated at age 15; her twin brother is in good health.  Older daughter has a history of strabismus and anxiety; younger daughter is in good health.     Review of Systems:  While lifting weights overhead 2 months ago, Landen heard a \"pop\" and noted the onset of right anterior shoulder pain, which has recurred intermittently since.  He notes prominence of the left great toe MTP, with mild pain when wearing snug-fitting shoes; no associated erythema or warmth.  History of \"mild\" obstructive sleep apnea, without ongoing treatment; frequent awakenings with unrefreshed sensation upon awakening without daytime somnolence.  Colonoscopy was completed in 2022; 5-year follow-up was recommended at that time.  Hepatitis C screen was negative in .  Most recent tetanus (unclear whether Td ord Tdap) was administered in 2011 (or 2012; documentation unclear).  MMR administered in 2012.  Recombinant zoster vaccination series was completed in 2022.  Covid 19 vaccination ([place]) administered on 2021; Moderna Covid vaccinations were administered on 10/25/2021, 2022, and 10/11/2022.  Tdap was administered in 2021.  He is scheduled for influenza and Covid vaccinations in the next week.  Remainder of complete review of systems was " "negative.     OBJECTIVE:     Vital signs: Height 74 inches.  Weight 215 pounds.  Blood pressure 139/90 on average of 3 automated readings.  Heart rate 65.  Respiratory rate 16.  Temperature 98.3 degrees.  O2 saturation 98% on room air.  General: Alert, neatly dressed and groomed, in no acute distress.  HEENT: Atraumatic and normocephalic. Eyelids, pupils, and conjunctivae appeared normal. Lips, teeth and gums appear normal.  Oropharynx showed moist mucous membranes, without exudate or erythema.  \"Crowded\" soft palate with somewhat narrow airway.  Neck: Supple, without thyromegaly, mass, or bruit. No cervical or supraclavicular lymphadenopathy.  Back: No spinal or costovertebral angle tenderness.  Chest: Clear to auscultation and percussion. Normal respiratory effort.  Cardiovascular: No jugular venous distention. Regular rate and rhythm, normal S1, S2 without murmur.  Abdomen: Bowel sounds positive; soft, nontender, without rebound, guarding, hepatosplenomegaly or mass.  Extremities: No cyanosis or edema.  Right shoulder examination showed normal rotator cuff strength.  No clavicle, AC joint, or bicipital groove tenderness.  Pain was elicited at 90 degrees of active and 120 degrees of passive abduction.  Genitalia: Normal male genitalia, without scrotal mass or hernia. No inguinal lymphadenopathy.  Rectal: Normal tone, with smooth, nontender, mildly enlarged prostate. No rectal mass.  Skin: Examination was deferred; full evaluation was completed earlier in day through dermatology clinic  Neurologic: Cranial nerves II-XII were grossly intact. Sensory and motor examinations were normal. Normal gait.  Mini-cog score was 4/5; 5/5 with minimal prompting.  Psychiatric: Alert and oriented ×3. Normal affect. Judgment and insight intact.  PHQ-2 score was 0.       Creatinine 0.97, alkaline phosphatase 49, ALT 25, cholesterol 201, HDL 50, , triglycerides 91, cholesterol/HDL 4.0, PSA 0.52, TSH 1.18, 25-hydroxy vitamin D " "61, glucose 105, white blood cell count 6200, hemoglobin 19.5, platelets 192,000, hematocrit 53.5, MCV 93, MCH 34.0 (normal range 26.5-33), MCHC 36.4, RDW 12.5, HIV nonreactive, urinalysis unremarkable.  JAK2 with reflex to MPL CALR MPN FOC NGS results pending.     EKG was unremarkable.  Spirometry showed an FEV1 of 4.20, with an FVC of 5.53; readings were 109% and 111% of predicted values, respectively.     DEXA results showed normal bone density, with most negative and valid T-score of -0.4 at the left femoral neck.      Body composition analysis showed 28.3% fat (19th percentile); body mass index was 27.6.  Readings from 12/2021 showed 24.6% fat (53rd percentile).       Chest x-ray (arranged at patient request based on previous recommendation and history of treated latent tuberculosis): \"No acute airspace disease. No radiographic evidence of tuberculosis infection.\"    ASSESSMENT:     1.  Erythrocytosis.  Previously noted to have splenomegaly on an incidental finding.  JAK2 mutation testing was recommended, but not pursued.  We agreed that he would proceed with this testing, discontinue use of testosterone in consultation with his outside prescribing provider, and pursue further evaluation based on test results.  In addition, I recommend clinic consultation to determine the status of obstructive sleep apnea and to pursue treatment as indicated.    2.  Dyslipidemia.  AHA/ACC guidelines suggest a 10-year vascular disease risk of 8.4%; optimal for his cohort is 4.8%.  After beginning a supplement that can impact cholesterol, he chose to reduce his dose of atorvastatin.  Based on current readings, I recommended that he resume previous dose of atorvastatin at 20 mg daily at bedtime and that he had low-strength (81 mg) aspirin, based on an approximate extrapolation that would suggest his 10-year risk with exceed 10% if not using a statin.  We discussed the importance of weight loss, continued regular exercise, and " modification of diet, the elements of which were discussed in detail.     3.  Impaired fasting glucose.  We discussed the nature of this condition along with importance of regular exercise, maintenance of ideal weight, and adherence to a healthful diet.  He was advised to arrange measurement of fasting glucose levels at least annually.     4.  Sleep apnea.  I recommended sleep clinic follow-up to discuss management, especially given current elevated blood pressure and erythrocytosis.     5.  Elevated blood pressure.  We reviewed usual goals and lifestyle measures that can help reduce blood pressure, including weight loss; aerobic exercise; avoidance of ibuprofen, naproxen, pseudoephedrine, etc.; reduced consumption of alcohol; and adequate treatment of sleep apnea.  He was advised to monitor and record home blood pressure readings with plan to review with M.D. if average exceeds 130/85.     6.  Overweight.  Dramatically improved body fat percentage relative to 2021, possibly related to testosterone use.  In the setting of erythrocytosis, I recommended that he wean off of testosterone, while continuing a regimen of regular exercise and following an appropriate diet.  We reviewed additional diet and exercise strategies for facilitating reduction of body fat and weight, including the potential benefit of 16/8 time-restricted eating.    7.  Bunion.  Examination of the left great toe was notable for prominence of the MTP joint, without warmth or erythema.  No significant dislocation appreciated.  We discussed the importance of using shoes with a wide toe box.  He requested further evaluation through orthopedics to discuss long-term management options.    8.  Shoulder pain.  Examination is suggestive of a rotator cuff tendinopathy.  Based on his persistent symptoms, I recommended physical therapy evaluation and treatment.    9.  Preventive care.  I recommended reducing daily dose of vitamin D3 supplements to 50 mcg  (currently 125 mcg).  He was advised to maintain daily calcium intake of 1200 mg, preferably from dietary sources.  He was congratulated for his regimen of regular exercise and encouraged to modify his diet and maintain ideal weight.  Colonoscopy was completed in 12/2022.  I recommended influenza, Covid, and hepatitis B vaccination series, which he elected to pursue on his on.  He was advised to discontinue use of thyroid supplements, given that pre-treatment TSH levels were normal.       PLAN: See above.     ~SRT

## 2023-11-26 LAB — TESTOST SERPL-MCNC: 1333 NG/DL (ref 240–950)

## 2023-11-30 ENCOUNTER — DOCUMENTATION ONLY (OUTPATIENT)
Dept: ONCOLOGY | Facility: CLINIC | Age: 58
End: 2023-11-30
Payer: COMMERCIAL

## 2023-11-30 DIAGNOSIS — D75.1 ERYTHROCYTOSIS: Primary | ICD-10-CM

## 2023-11-30 DIAGNOSIS — N28.1 CYST OF KIDNEY, ACQUIRED: ICD-10-CM

## 2023-11-30 LAB
INTERPRETATION: NORMAL
SIGNIFICANT RESULTS: NORMAL
SPECIMEN DESCRIPTION: NORMAL
TEST DETAILS, MDL: NORMAL

## 2023-12-01 ENCOUNTER — LAB (OUTPATIENT)
Dept: LAB | Facility: CLINIC | Age: 58
End: 2023-12-01
Payer: COMMERCIAL

## 2023-12-01 ENCOUNTER — MYC MEDICAL ADVICE (OUTPATIENT)
Dept: INTERNAL MEDICINE | Facility: CLINIC | Age: 58
End: 2023-12-01

## 2023-12-01 ENCOUNTER — HOSPITAL ENCOUNTER (OUTPATIENT)
Dept: ULTRASOUND IMAGING | Facility: CLINIC | Age: 58
Discharge: HOME | End: 2023-12-01
Attending: INTERNAL MEDICINE
Payer: COMMERCIAL

## 2023-12-01 DIAGNOSIS — N28.1 RENAL CYST: ICD-10-CM

## 2023-12-01 DIAGNOSIS — D75.1 ERYTHROCYTOSIS: ICD-10-CM

## 2023-12-01 DIAGNOSIS — N28.1 KIDNEY CYST, ACQUIRED: ICD-10-CM

## 2023-12-01 DIAGNOSIS — D75.1 ERYTHROCYTOSIS: Primary | ICD-10-CM

## 2023-12-01 DIAGNOSIS — N28.1 CYST OF KIDNEY, ACQUIRED: ICD-10-CM

## 2023-12-01 LAB
BASOPHILS # BLD AUTO: 0 10E3/UL (ref 0–0.2)
BASOPHILS NFR BLD AUTO: 1 %
EOSINOPHIL # BLD AUTO: 0.1 10E3/UL (ref 0–0.7)
EOSINOPHIL NFR BLD AUTO: 2 %
ERYTHROCYTE [DISTWIDTH] IN BLOOD BY AUTOMATED COUNT: 12.4 % (ref 10–15)
HCT VFR BLD AUTO: 52.5 % (ref 40–53)
HGB BLD-MCNC: 19.3 G/DL (ref 13.3–17.7)
IMM GRANULOCYTES # BLD: 0 10E3/UL
IMM GRANULOCYTES NFR BLD: 0 %
LYMPHOCYTES # BLD AUTO: 1.9 10E3/UL (ref 0.8–5.3)
LYMPHOCYTES NFR BLD AUTO: 34 %
MCH RBC QN AUTO: 33.9 PG (ref 26.5–33)
MCHC RBC AUTO-ENTMCNC: 36.8 G/DL (ref 31.5–36.5)
MCV RBC AUTO: 92 FL (ref 78–100)
MONOCYTES # BLD AUTO: 0.5 10E3/UL (ref 0–1.3)
MONOCYTES NFR BLD AUTO: 9 %
NEUTROPHILS # BLD AUTO: 3 10E3/UL (ref 1.6–8.3)
NEUTROPHILS NFR BLD AUTO: 54 %
NRBC # BLD AUTO: 0 10E3/UL
NRBC BLD AUTO-RTO: 0 /100
PLATELET # BLD AUTO: 186 10E3/UL (ref 150–450)
RBC # BLD AUTO: 5.7 10E6/UL (ref 4.4–5.9)
WBC # BLD AUTO: 5.6 10E3/UL (ref 4–11)

## 2023-12-01 PROCEDURE — 76770 US EXAM ABDO BACK WALL COMP: CPT

## 2023-12-01 PROCEDURE — 36415 COLL VENOUS BLD VENIPUNCTURE: CPT

## 2023-12-01 PROCEDURE — 76770 US EXAM ABDO BACK WALL COMP: CPT | Mod: 26 | Performed by: STUDENT IN AN ORGANIZED HEALTH CARE EDUCATION/TRAINING PROGRAM

## 2023-12-01 PROCEDURE — 82668 ASSAY OF ERYTHROPOIETIN: CPT

## 2023-12-01 PROCEDURE — 85025 COMPLETE CBC W/AUTO DIFF WBC: CPT

## 2023-12-01 NOTE — PROGRESS NOTES
Brief On Call Hematology Note:    Received page from Dr. Sawant that patient's hemoglobin resulted as 19.5 and pt planning to travel out of state this weekend (4 weeks trip) and if further evaluation or phlebotomy or treatments are indicated before his trip.    Pt has elevated hemoglobin for few years now.  He was seen by Dr. Suero on 02/2022 with hgb in 16s and 13 cm splenomegaly when JAK2 testing was recommended and management of BARAK. Pt was started on aspirin 81 mg then. 11/25/2022 it was 17.5. Pt did not get REJI 2 testing then and hasn't followed up with Dr. Suero since then.    Today CBC shows Hgb 19.5, Other CBC is wnl. Creatinine wnl. He also takes testosterone and testosterone level is 1300. EPO not checked yet. Recent REJI 2V617 and exon 12 tested negative, pending CALR MPL.       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 Polycythemia vera are most commonly JAK2 positive in 80% cases.    - discussed checking EPO level, PO hydration, repeat CBC tomorrow.  - STOP testosterone, treat BARAK with CPAP, trend CBC   - unfortunately nothing much to do overnight- strict ER precautions to patient if vision changes, chest pain, or neuro symptoms.  - Continue aspirin 81 mg daily (no prior cardiac issues, no VTE in the past)  - Cautious phlebotomy (limited to 250 ml x1 ) could be considered in secondary polycythemia if pt is symptomatic, it has not shown benefit without symptoms, priority is treat underlying cause. Given secondary causes specially BARAK high on differential, phlebotomy could also cause pt to have increased dyspnea due to exacerbated hypoxia. Would advise discussion with Dr. Suero during day time tomorrow for additional insights.  - will send my note to Dr. Cruz to have his team reach out in the AM.    Please call us with further questions.    Jo Ann Prescott,  MD  Hematology and Medical Oncology Fellow, PGY-5  [facility]  Pager: [phone]

## 2023-12-02 LAB — EPO SERPL-ACNC: 6 MU/ML

## 2023-12-16 DIAGNOSIS — D75.1 ERYTHROCYTOSIS: Primary | ICD-10-CM

## 2025-01-12 ENCOUNTER — HEALTH MAINTENANCE LETTER (OUTPATIENT)
Age: 60
End: 2025-01-12

## 2025-03-11 NOTE — PROGRESS NOTES
Today we discussed:  Advised pt to keep BP diary and return to clinic with home readings in approximately four weeks. Discussed how to use home monitor and tips for checking BP accurately.   Sending meclizine to assist with vertigo flares  Continue PT exercises at home      Thank you and great to see you today!   Please reach out on MyChart with any questions.   Corrina Cespedes, RUTH - CNP     AUDIOLOGY REPORT  [facility]    SUMMARY: Audiology visit completed. See audiogram for results.      RECOMMENDATIONS: Follow-up with referring physician.      Tiago Reyes  Audiologist  [place] License  #[id number]

## (undated) DEVICE — KIT ENDO TURNOVER/PROCEDURE W/CLEAN A SCOPE LINERS 103888

## (undated) RX ORDER — FENTANYL CITRATE 50 UG/ML
INJECTION, SOLUTION INTRAMUSCULAR; INTRAVENOUS
Status: DISPENSED
Start: 2022-12-21

## (undated) RX ORDER — SIMETHICONE 40MG/0.6ML
SUSPENSION, DROPS(FINAL DOSAGE FORM)(ML) ORAL
Status: DISPENSED
Start: 2022-12-21